# Patient Record
Sex: MALE | Race: WHITE | NOT HISPANIC OR LATINO | Employment: FULL TIME | URBAN - NONMETROPOLITAN AREA
[De-identification: names, ages, dates, MRNs, and addresses within clinical notes are randomized per-mention and may not be internally consistent; named-entity substitution may affect disease eponyms.]

---

## 2017-01-29 ENCOUNTER — HOSPITAL ENCOUNTER (EMERGENCY)
Facility: HOSPITAL | Age: 42
Discharge: LEFT BEFORE DISCHARGE | End: 2017-01-29
Attending: EMERGENCY MEDICINE
Payer: COMMERCIAL

## 2017-01-29 VITALS
TEMPERATURE: 99 F | HEART RATE: 70 BPM | HEIGHT: 78 IN | RESPIRATION RATE: 22 BRPM | SYSTOLIC BLOOD PRESSURE: 111 MMHG | BODY MASS INDEX: 19.67 KG/M2 | WEIGHT: 170 LBS | OXYGEN SATURATION: 96 % | DIASTOLIC BLOOD PRESSURE: 56 MMHG

## 2017-01-29 DIAGNOSIS — T78.40XA ALLERGIC REACTION: Primary | ICD-10-CM

## 2017-01-29 PROCEDURE — 99283 EMERGENCY DEPT VISIT LOW MDM: CPT

## 2017-01-29 PROCEDURE — 96361 HYDRATE IV INFUSION ADD-ON: CPT

## 2017-01-29 PROCEDURE — 96374 THER/PROPH/DIAG INJ IV PUSH: CPT

## 2017-01-29 PROCEDURE — 96375 TX/PRO/DX INJ NEW DRUG ADDON: CPT

## 2017-01-29 RX ORDER — DIPHENHYDRAMINE HYDROCHLORIDE 50 MG/ML
25 INJECTION INTRAMUSCULAR; INTRAVENOUS ONCE
Status: COMPLETED | OUTPATIENT
Start: 2017-01-29 | End: 2017-01-29

## 2017-01-29 RX ORDER — METHYLPREDNISOLONE SODIUM SUCCINATE 125 MG/2ML
125 INJECTION, POWDER, LYOPHILIZED, FOR SOLUTION INTRAMUSCULAR; INTRAVENOUS ONCE
Status: COMPLETED | OUTPATIENT
Start: 2017-01-29 | End: 2017-01-29

## 2017-01-29 RX ORDER — PREDNISONE 20 MG/1
60 TABLET ORAL DAILY
Qty: 15 TABLET | Refills: 0 | Status: SHIPPED | OUTPATIENT
Start: 2017-01-29 | End: 2017-02-03

## 2017-01-29 RX ORDER — TRAZODONE HYDROCHLORIDE 100 MG/1
100 TABLET ORAL
COMMUNITY
End: 2018-04-07

## 2017-01-29 RX ORDER — LITHIUM CARBONATE 600 MG/1
300 CAPSULE ORAL 2 TIMES DAILY WITH MEALS
COMMUNITY
End: 2018-04-07

## 2017-01-29 RX ORDER — RISPERIDONE 0.5 MG/1
0.5 TABLET, FILM COATED ORAL 2 TIMES DAILY
COMMUNITY
End: 2018-04-07

## 2017-01-29 RX ADMIN — DIPHENHYDRAMINE HYDROCHLORIDE 25 MG: 50 INJECTION, SOLUTION INTRAMUSCULAR; INTRAVENOUS at 20:34

## 2017-01-29 RX ADMIN — FAMOTIDINE 20 MG: 10 INJECTION, SOLUTION INTRAVENOUS at 20:34

## 2017-01-29 RX ADMIN — SODIUM CHLORIDE 500 ML: 0.9 INJECTION, SOLUTION INTRAVENOUS at 20:35

## 2017-01-29 RX ADMIN — METHYLPREDNISOLONE SODIUM SUCCINATE 125 MG: 125 INJECTION, POWDER, FOR SOLUTION INTRAMUSCULAR; INTRAVENOUS at 20:35

## 2018-04-07 ENCOUNTER — HOSPITAL ENCOUNTER (EMERGENCY)
Facility: HOSPITAL | Age: 43
Discharge: HOME | End: 2018-04-07
Attending: EMERGENCY MEDICINE | Admitting: EMERGENCY MEDICINE
Payer: COMMERCIAL

## 2018-04-07 ENCOUNTER — APPOINTMENT (EMERGENCY)
Dept: RADIOLOGY | Facility: HOSPITAL | Age: 43
End: 2018-04-07
Payer: COMMERCIAL

## 2018-04-07 VITALS
TEMPERATURE: 98.7 F | HEART RATE: 89 BPM | RESPIRATION RATE: 18 BRPM | SYSTOLIC BLOOD PRESSURE: 124 MMHG | WEIGHT: 200 LBS | OXYGEN SATURATION: 95 % | BODY MASS INDEX: 23.11 KG/M2 | DIASTOLIC BLOOD PRESSURE: 78 MMHG

## 2018-04-07 DIAGNOSIS — E87.6 HYPOKALEMIA: ICD-10-CM

## 2018-04-07 DIAGNOSIS — R60.0 BILATERAL LOWER EXTREMITY EDEMA: Primary | ICD-10-CM

## 2018-04-07 LAB
ALBUMIN SERPL BCP-MCNC: 3.1 G/DL (ref 3.5–5)
ALP SERPL-CCNC: 69 U/L (ref 46–116)
ALT SERPL W P-5'-P-CCNC: 16 U/L (ref 12–78)
ANION GAP SERPL CALCULATED.3IONS-SCNC: 8 MMOL/L (ref 4–13)
AST SERPL W P-5'-P-CCNC: 11 U/L (ref 5–45)
BASOPHILS # BLD AUTO: 0.05 THOUSANDS/ΜL (ref 0–0.1)
BASOPHILS NFR BLD AUTO: 0 % (ref 0–1)
BILIRUB SERPL-MCNC: 0.4 MG/DL (ref 0.2–1)
BUN SERPL-MCNC: 24 MG/DL (ref 5–25)
CALCIUM SERPL-MCNC: 8.7 MG/DL (ref 8.3–10.1)
CHLORIDE SERPL-SCNC: 101 MMOL/L (ref 100–108)
CK SERPL-CCNC: 60 U/L (ref 39–308)
CO2 SERPL-SCNC: 30 MMOL/L (ref 21–32)
CREAT SERPL-MCNC: 0.85 MG/DL (ref 0.6–1.3)
EOSINOPHIL # BLD AUTO: 0.23 THOUSAND/ΜL (ref 0–0.61)
EOSINOPHIL NFR BLD AUTO: 2 % (ref 0–6)
ERYTHROCYTE [DISTWIDTH] IN BLOOD BY AUTOMATED COUNT: 13.6 % (ref 11.6–15.1)
GFR SERPL CREATININE-BSD FRML MDRD: 107 ML/MIN/1.73SQ M
GLUCOSE SERPL-MCNC: 117 MG/DL (ref 65–140)
HCT VFR BLD AUTO: 40.4 % (ref 36.5–49.3)
HGB BLD-MCNC: 13.9 G/DL (ref 12–17)
LYMPHOCYTES # BLD AUTO: 1.21 THOUSANDS/ΜL (ref 0.6–4.47)
LYMPHOCYTES NFR BLD AUTO: 9 % (ref 14–44)
MAGNESIUM SERPL-MCNC: 2.1 MG/DL (ref 1.6–2.6)
MCH RBC QN AUTO: 30.4 PG (ref 26.8–34.3)
MCHC RBC AUTO-ENTMCNC: 34.4 G/DL (ref 31.4–37.4)
MCV RBC AUTO: 88 FL (ref 82–98)
MONOCYTES # BLD AUTO: 1.04 THOUSAND/ΜL (ref 0.17–1.22)
MONOCYTES NFR BLD AUTO: 8 % (ref 4–12)
NEUTROPHILS # BLD AUTO: 11.14 THOUSANDS/ΜL (ref 1.85–7.62)
NEUTS SEG NFR BLD AUTO: 81 % (ref 43–75)
PLATELET # BLD AUTO: 334 THOUSANDS/UL (ref 149–390)
PMV BLD AUTO: 9.6 FL (ref 8.9–12.7)
POTASSIUM SERPL-SCNC: 3.2 MMOL/L (ref 3.5–5.3)
PROT SERPL-MCNC: 6.5 G/DL (ref 6.4–8.2)
RBC # BLD AUTO: 4.57 MILLION/UL (ref 3.88–5.62)
SODIUM SERPL-SCNC: 139 MMOL/L (ref 136–145)
TROPONIN I SERPL-MCNC: <0.02 NG/ML
WBC # BLD AUTO: 13.67 THOUSAND/UL (ref 4.31–10.16)

## 2018-04-07 PROCEDURE — 71045 X-RAY EXAM CHEST 1 VIEW: CPT

## 2018-04-07 PROCEDURE — 99284 EMERGENCY DEPT VISIT MOD MDM: CPT

## 2018-04-07 PROCEDURE — 82550 ASSAY OF CK (CPK): CPT | Performed by: EMERGENCY MEDICINE

## 2018-04-07 PROCEDURE — 85025 COMPLETE CBC W/AUTO DIFF WBC: CPT | Performed by: EMERGENCY MEDICINE

## 2018-04-07 PROCEDURE — 36415 COLL VENOUS BLD VENIPUNCTURE: CPT | Performed by: EMERGENCY MEDICINE

## 2018-04-07 PROCEDURE — 96374 THER/PROPH/DIAG INJ IV PUSH: CPT

## 2018-04-07 PROCEDURE — 93005 ELECTROCARDIOGRAM TRACING: CPT | Performed by: EMERGENCY MEDICINE

## 2018-04-07 PROCEDURE — 84484 ASSAY OF TROPONIN QUANT: CPT | Performed by: EMERGENCY MEDICINE

## 2018-04-07 PROCEDURE — 83735 ASSAY OF MAGNESIUM: CPT | Performed by: EMERGENCY MEDICINE

## 2018-04-07 PROCEDURE — 80053 COMPREHEN METABOLIC PANEL: CPT | Performed by: EMERGENCY MEDICINE

## 2018-04-07 PROCEDURE — 96375 TX/PRO/DX INJ NEW DRUG ADDON: CPT

## 2018-04-07 RX ORDER — POTASSIUM CHLORIDE 20 MEQ/1
40 TABLET, EXTENDED RELEASE ORAL ONCE
Status: COMPLETED | OUTPATIENT
Start: 2018-04-07 | End: 2018-04-07

## 2018-04-07 RX ORDER — POTASSIUM CHLORIDE 20 MEQ/1
20 TABLET, EXTENDED RELEASE ORAL 2 TIMES DAILY
Qty: 30 TABLET | Refills: 0 | Status: SHIPPED | OUTPATIENT
Start: 2018-04-07 | End: 2019-01-23 | Stop reason: HOSPADM

## 2018-04-07 RX ORDER — KETOROLAC TROMETHAMINE 30 MG/ML
15 INJECTION, SOLUTION INTRAMUSCULAR; INTRAVENOUS ONCE
Status: COMPLETED | OUTPATIENT
Start: 2018-04-07 | End: 2018-04-07

## 2018-04-07 RX ORDER — FUROSEMIDE 10 MG/ML
20 INJECTION INTRAMUSCULAR; INTRAVENOUS ONCE
Status: COMPLETED | OUTPATIENT
Start: 2018-04-07 | End: 2018-04-07

## 2018-04-07 RX ADMIN — FUROSEMIDE 20 MG: 10 INJECTION, SOLUTION INTRAMUSCULAR; INTRAVENOUS at 07:57

## 2018-04-07 RX ADMIN — KETOROLAC TROMETHAMINE 15 MG: 30 INJECTION, SOLUTION INTRAMUSCULAR at 07:55

## 2018-04-07 RX ADMIN — POTASSIUM CHLORIDE 40 MEQ: 1500 TABLET, EXTENDED RELEASE ORAL at 07:59

## 2018-04-07 NOTE — ED PROVIDER NOTES
History  Chief Complaint   Patient presents with    Leg Swelling     pt stated b/l leg pain after a 10mile hike 4 days ago  78-year-old male presents with leg swelling over the last 3 days  He did walk at a brisk pace for 6 miles up and down a [place] and then an additional 4 miles all in a period of 3 hours  He states he is typically in good shape as he does a lot of walking because he does not own a car  Complains of a sensation of razor blades to the bilateral legs  He denies any numbness or tingling there is no trauma or fall  He had happened 6 months ago to his feet and his toes blister that time he has noted no skin lesions or or blisters  No fever or chills  He denies increasing dyspnea on exertion no pleuritic pain no prior history of DVT or PE  He also relates over the last she has been having intermittent headaches with vision problems  None currently             None       Past Medical History:   Diagnosis Date    COPD (chronic obstructive pulmonary disease) ([facility] [address] )     Hyperlipidemia     Hypertension     Psychiatric disorder     bipolar       Past Surgical History:   Procedure Laterality Date    HERNIA REPAIR      TUMOR REMOVAL      groin and shoulder       History reviewed  No pertinent family history  I have reviewed and agree with the history as documented  Social History   Substance Use Topics    Smoking status: Current Every Day Smoker     Types: Cigarettes    Smokeless tobacco: Not on file    Alcohol use No        Review of Systems   Constitutional: Negative for activity change, appetite change, chills, diaphoresis and fever  HENT: Negative for congestion, ear pain, rhinorrhea, sneezing and sore throat  Eyes: Negative for discharge and visual disturbance  Respiratory: Negative for cough and shortness of breath  Cardiovascular: Positive for chest pain and leg swelling  Gastrointestinal: Negative for abdominal pain, blood in stool, diarrhea, nausea and vomiting  Endocrine: Negative for polyuria  Genitourinary: Negative for dysuria, frequency and urgency  Musculoskeletal: Positive for gait problem  Negative for back pain, joint swelling and myalgias  Skin: Negative for rash and wound  Neurological: Negative for dizziness and numbness  Hematological: Negative for adenopathy  Psychiatric/Behavioral: Negative for confusion  All other systems reviewed and are negative  Physical Exam  ED Triage Vitals [04/07/18 0606]   Temperature Pulse Respirations Blood Pressure SpO2   98 7 °F (37 1 °C) 100 18 124/78 98 %      Temp Source Heart Rate Source Patient Position - Orthostatic VS BP Location FiO2 (%)   Temporal Monitor Sitting Left arm --      Pain Score       Worst Possible Pain           Orthostatic Vital Signs  Vitals:    04/07/18 0606 04/07/18 0630   BP: 124/78    Pulse: 100 89   Patient Position - Orthostatic VS: Sitting        Physical Exam   Constitutional: He is oriented to person, place, and time  He appears well-developed  No distress  HENT:   Head: Normocephalic and atraumatic  Right Ear: External ear normal    Left Ear: External ear normal    Nose: Nose normal    Mouth/Throat: Oropharynx is clear and moist    Eyes: Conjunctivae and EOM are normal  Pupils are equal, round, and reactive to light  Right eye exhibits no discharge  Left eye exhibits no discharge  No scleral icterus  Neck: Normal range of motion  Neck supple  Cardiovascular: Normal rate, regular rhythm, normal heart sounds and intact distal pulses  Pulmonary/Chest: Effort normal and breath sounds normal  No respiratory distress  distant   Abdominal: Soft  Bowel sounds are normal  He exhibits no distension and no mass  There is no tenderness  There is no rebound and no guarding  Back: no mildline or CVA tenderness   Musculoskeletal: Normal range of motion  He exhibits edema (1+ pretibial symmetric)  He exhibits no tenderness or deformity     Lymphadenopathy:     He has no cervical adenopathy  Neurological: He is alert and oriented to person, place, and time  He displays normal reflexes  No cranial nerve deficit  He exhibits normal muscle tone  Gait steady   Skin: Skin is warm and dry  He is not diaphoretic  Psychiatric: He has a normal mood and affect  Nursing note and vitals reviewed  ED Medications  Medications   potassium chloride (K-DUR,KLOR-CON) CR tablet 40 mEq (40 mEq Oral Given 4/7/18 0759)   furosemide (LASIX) injection 20 mg (20 mg Intravenous Given 4/7/18 0437)   ketorolac (TORADOL) injection 15 mg (15 mg Intravenous Given 4/7/18 3115)       Diagnostic Studies  Results Reviewed     Procedure Component Value Units Date/Time    Magnesium [[id number]]  (Normal) Collected:  04/07/18 0733    Lab Status:  Final result Specimen:  Blood Updated:  04/07/18 0741     Magnesium 2 1 mg/dL     Troponin I [[id number]]  (Normal) Collected:  04/07/18 0640    Lab Status:  Final result Specimen:  Blood from Arm, Right Updated:  04/07/18 0721     Troponin I <0 02 ng/mL     Narrative:         [employer] Chemistry analyzer 99% cutoff is > 0 04 ng/mL in network labs    o cTnI 99% cutoff is useful only when applied to patients in the clinical setting of myocardial ischemia  o cTnI 99% cutoff should be interpreted in the context of clinical history, ECG findings and possibly cardiac imaging to establish correct diagnosis  o cTnI 99% cutoff may be suggestive but clearly not indicative of a coronary event without the clinical setting of myocardial ischemia      Comprehensive metabolic panel [[id number]]  (Abnormal) Collected:  04/07/18 0640    Lab Status:  Final result Specimen:  Blood from Arm, Right Updated:  04/07/18 0716     Sodium 139 mmol/L      Potassium 3 2 (L) mmol/L      Chloride 101 mmol/L      CO2 30 mmol/L      Anion Gap 8 mmol/L      BUN 24 mg/dL      Creatinine 0 85 mg/dL      Glucose 117 mg/dL      Calcium 8 7 mg/dL      AST 11 U/L      ALT 16 U/L      Alkaline Phosphatase 69 U/L Total Protein 6 5 g/dL      Albumin 3 1 (L) g/dL      Total Bilirubin 0 40 mg/dL      eGFR 107 ml/min/1 73sq m     Narrative:         National Kidney Disease Education Program recommendations are as follows:  GFR calculation is accurate only with a steady state creatinine  Chronic Kidney disease less than 60 ml/min/1 73 sq  meters  Kidney failure less than 15 ml/min/1 73 sq  meters  CK Total with Reflex CKMB [[id number]]  (Normal) Collected:  04/07/18 0640    Lab Status:  Final result Specimen:  Blood from Arm, Right Updated:  04/07/18 0716     Total CK 60 U/L     CBC and differential [[id number]]  (Abnormal) Collected:  04/07/18 0640    Lab Status:  Final result Specimen:  Blood from Arm, Right Updated:  04/07/18 0703     WBC 13 67 (H) Thousand/uL      RBC 4 57 Million/uL      Hemoglobin 13 9 g/dL      Hematocrit 40 4 %      MCV 88 fL      MCH 30 4 pg      MCHC 34 4 g/dL      RDW 13 6 %      MPV 9 6 fL      Platelets 816 Thousands/uL      Neutrophils Relative 81 (H) %      Lymphocytes Relative 9 (L) %      Monocytes Relative 8 %      Eosinophils Relative 2 %      Basophils Relative 0 %      Neutrophils Absolute 11 14 (H) Thousands/µL      Lymphocytes Absolute 1 21 Thousands/µL      Monocytes Absolute 1 04 Thousand/µL      Eosinophils Absolute 0 23 Thousand/µL      Basophils Absolute 0 05 Thousands/µL     UA w Reflex to Microscopic w Reflex to Culture [[id number]]     Lab Status:  No result Specimen:  Urine                  XR chest 1 view portable   Final Result by Lavern Zayas MD (04/07 0815)      No acute cardiopulmonary disease              Workstation performed: [id number]                    Procedures  ECG 12 Lead Documentation  Date/Time: 4/7/2018 7:00 AM  Performed by: Mendy Trejo  Authorized by: Mendy Trejo     Indications / Diagnosis:  Leg swelling  ECG reviewed by me, the ED Provider: yes    Patient location:  ED  Previous ECG:     Previous ECG:  Unavailable  Rate:     ECG rate:  96 ECG rate assessment: normal    Rhythm:     Rhythm: sinus rhythm    QRS:     QRS axis:  Normal  Comments:      No acute ischemic changes           Phone Contacts  ED Phone Contact    ED Course  ED Course as of Apr 07 0918   Sat Apr 07, 2018   2639 Patient states he is no longer on Lithium    0854 Resting comfortably  Reviewed results will supplement postasssium    0859 Urinated large amount legs feel better    0911 PIV left a/c discontinued tip intact                                MDM  Number of Diagnoses or Management Options  Bilateral lower extremity edema:   Hypokalemia:   Diagnosis management comments: Mdm: chf, acs, overuse, rhabdo, no clinical evidence of compartment syndrome    Recommended establ of PMD provided with referral numbers for futher eval of headaches/visual changes  No current symptoms    CritCare Time    Disposition  Final diagnoses:   Bilateral lower extremity edema   Hypokalemia     Time reflects when diagnosis was documented in both MDM as applicable and the Disposition within this note     Time User Action Codes Description Comment    4/7/2018  9:01 AM Reji Allen Add [R60 0] Bilateral lower extremity edema     4/7/2018  9:02 AM Reji Allen Add [E87 6] Hypokalemia       ED Disposition     ED Disposition Condition Comment    Discharge  [address] discharge to home/self care  Condition at discharge: Good        Follow-up Information     Follow up With Specialties Details Why [address]  Call in 1 day establish family docotor [phone]          Patient's Medications   Discharge Prescriptions    POTASSIUM CHLORIDE (K-DUR,KLOR-CON) 20 MEQ TABLET    Take 1 tablet (20 mEq total) by mouth 2 (two) times a day       Start Date: 4/7/2018  End Date: --       Order Dose: 20 mEq       Quantity: 30 tablet    Refills: 0     No discharge procedures on file      ED Provider  Electronically Signed by           Comfort Aldrich MD  04/07/18 0321

## 2018-04-07 NOTE — DISCHARGE INSTRUCTIONS
Leg Edema   WHAT YOU NEED TO KNOW:   Leg edema is swelling caused by fluid buildup  Your legs may swell if you sit or stand for long periods of time, are pregnant, or are injured  Swelling may also occur if you have heart failure or circulation problems  This means that your heart does not pump blood through your body as it should  DISCHARGE INSTRUCTIONS:   Self-care:   · Elevate your legs:  Raise your legs above the level of your heart as often as you can  This will help decrease swelling and pain  Prop your legs on pillows or blankets to keep them elevated comfortably  · Wear pressure stockings: These tight stockings put pressure on your legs to promote blood flow and prevent blood clots  Wear the stockings during the day  Do not wear them while you sleep  · Apply heat:  Heat helps decrease pain and swelling  Apply heat on the area for 20 to 30 minutes every 2 hours for as many days as directed  · Stay active:  Do not stand or sit for long periods of time  Ask your healthcare provider about the best exercise plan for you  · Eat healthy foods:  Healthy foods include fruits, vegetables, whole-grain breads, low-fat dairy products, beans, lean meats, and fish  Ask if you need to be on a special diet  Limit salt  Salt will make your body hold even more fluid  Follow up with your healthcare provider as directed:  Write down your questions so you remember to ask them during your visits  Contact your healthcare provider if:   · You have a fever or feel more tired than usual     · The veins in your legs look larger than usual  They may look full or bulging  · Your legs itch or feel heavy  · You have red or white areas or sores on your legs  The skin may also appear dimpled or have indentations  · You are gaining weight  · You have trouble moving your ankles  · The swelling does not go away, or other parts of your body swell      · You have questions or concerns about your condition or care   Return to the emergency department if:   · You cannot walk  · You feel faint or confused  · Your skin turns blue or gray  · Your leg feels warm, tender, and painful  It may be swollen and red  · You have chest pain or trouble breathing that is worse when you lie down  · You suddenly feel lightheaded and have trouble breathing  · You have new and sudden chest pain  You may have more pain when you take deep breaths or cough  You may also cough up blood  © 2017 [address] Information is for End User's use only and may not be sold, redistributed or otherwise used for commercial purposes  All illustrations and images included in CareNotes® are the copyrighted property of A D A M , Inc  or Damian Arnold  The above information is an  only  It is not intended as medical advice for individual conditions or treatments  Talk to your doctor, nurse or pharmacist before following any medical regimen to see if it is safe and effective for you  Hypokalemia   WHAT YOU NEED TO KNOW:   Hypokalemia is a low level of potassium in your blood  Potassium helps control how your muscles, heart, and digestive system work  Hypokalemia occurs when your body loses too much potassium or does not absorb enough from food  DISCHARGE INSTRUCTIONS:   Return to the emergency department if:   · You cannot move your arm or leg  · You have a fast or irregular heartbeat  · You are too tired or weak to stand up  Contact your healthcare provider if:   · You are vomiting, or you have diarrhea  · You have numbness or tingling in your arms or legs  · Your symptoms do not go away or they get worse  · You have questions or concerns about your condition or care  Medicines:   · Potassium  will be given to bring your potassium levels back to normal     · Take your medicine as directed    Contact your healthcare provider if you think your medicine is not helping or if you have side effects  Tell him of her if you are allergic to any medicine  Keep a list of the medicines, vitamins, and herbs you take  Include the amounts, and when and why you take them  Bring the list or the pill bottles to follow-up visits  Carry your medicine list with you in case of an emergency  Eat foods that are high in potassium:  Foods that are high in potassium include bananas, oranges, tomatoes, potatoes, and avocado  Miller beans, turkey, salmon, lean beef, yogurt, and milk are also high in potassium  Ask your healthcare provider or dietitian for more information about foods that are high in potassium  Follow up with your healthcare provider as directed:  Write down your questions so you remember to ask them during your visits  © 2017 [address] Information is for End User's use only and may not be sold, redistributed or otherwise used for commercial purposes  All illustrations and images included in CareNotes® are the copyrighted property of A D A M , Inc  or Damian Arnold  The above information is an  only  It is not intended as medical advice for individual conditions or treatments  Talk to your doctor, nurse or pharmacist before following any medical regimen to see if it is safe and effective for you  Potassium Content of Foods List   WHAT YOU NEED TO KNOW:   Potassium is a mineral that is found in most foods  Potassium helps to balance fluids and minerals in your body  It also helps your body maintain a normal blood pressure  Potassium helps your muscles contract and your nerves function normally  You may need to increase or decrease potassium if you have certain health conditions  DISCHARGE INSTRUCTIONS:   Why you may need to change the amount of potassium you eat:   · You may need more potassium  if you have hypokalemia (low potassium levels) or high blood pressure  You may also need more potassium if you are taking diuretics   Diuretics and certain medicines cause your body to lose potassium  · You may need less potassium  in your diet if you have hyperkalemia (high potassium levels) or kidney disease  Potassium content of fruit:  The amount of potassium in milligrams (mg) contained in each fruit or serving of fruit is listed beside the item    · High-potassium foods (more than 200 mg per serving):      ¨ 1 medium banana (425)    ¨ ½ of a papaya (390)    ¨ ½ cup of prune juice (370)    ¨ ¼ cup of raisins (270)    ¨ 1 medium zane (325) or kiwi (240)    ¨ 1 small orange (240) or ½ cup of orange juice (235)    ¨ ½ cup of cubed cantaloupe (215) or diced honeydew melon (200)    ¨ 1 medium pear (200)    · Medium-potassium foods (50 to 200 mg per serving):      ¨ 1 medium peach (185)    ¨ 1 small apple or ½ cup of apple juice (150)    ¨ ½ cup of peaches canned in juice (120)    ¨ ½ cup of canned pineapple (100)    ¨ ½ cup of fresh, sliced strawberries (738)    ¨ ½ cup of watermelon (85)    · Low-potassium foods (less than 50 mg per serving):      ¨ ½ cup of cranberries (45) or cranberry juice cocktail (20)    ¨ ½ cup of nectar of papaya, zane, or pear (35)  Potassium content of vegetables:   · High-potassium foods (more than 200 mg per serving):      ¨ 1 medium baked potato, with skin (925)    ¨ 1 baked medium sweet potato, with skin (450)    ¨ ½ cup of tomato or vegetable juice (275), or 1 medium raw tomato (290)    ¨ ½ cup of mushrooms (280)    ¨ ½ cup of fresh brussels sprouts (250)    ¨ ½ cup of cooked zucchini (220) or winter squash (250)    ¨ ¼ of a medium avocado (245)    ¨ ½ cup of broccoli (230)    · Medium-potassium foods (50 to 200 mg per serving):      ¨ ½ cup of corn (195)    ¨ ½ cup of fresh or cooked carrots (180)    ¨ ½ cup of fresh cauliflower (150)    ¨ ½ cup of asparagus (155)    ¨ ½ cup of canned peas (90)     ¨ 1 cup of lettuce, all types (100)    ¨ ½ cup of fresh green beans (90)    ¨ ½ cup of frozen green beans (85)    ¨ ½ cup of cucumber (80)  Potassium content of protein foods:   · High-potassium foods (more than 200 mg per serving):      ¨ ½ cup of cooked booth beans (400) or lentils (365)    ¨ 1 cup of soy milk (300)    ¨ 3 ounces of baked or broiled salmon (319)    ¨ 3 ounces of roasted turkey, dark meat (250)    ¨ ¼ cup of sunflower seeds (241)    ¨ 3 ounces of cooked lean beef (224)    ¨ 2 tablespoons of smooth peanut butter (210)    · Medium-potassium foods (50 to 200 mg per serving):      ¨ 1 ounce of salted peanuts, almonds, or cashews (200)    ¨ 1 large egg (60 mg)  Potassium content of dairy foods:   · High-potassium foods (more than 200 mg per serving):      ¨ 6 ounces of yogurt (260 to 435)    ¨ 1 cup of nonfat, low-fat, or whole milk (350 to 380)    · Medium-potassium foods (50 to 200 mg per serving):      ¨ ½ cup of ricotta cheese (154)    ¨ ½ cup of vanilla ice cream (131)    ¨ ½ cup of low-fat (2%) cottage cheese (110)    · Low-potassium foods (less than 50 mg per serving):      ¨ 1 ounce of cheese (20 to 30)    Potassium content of grains:   · 1 slice of white bread (30)    · ½ cup of white or brown rice (50)    · ½ cup of spaghetti or macaroni (30)    · 1 flour or corn tortilla (50)    · 1 four-inch waffle (50)  Potassium content of other foods:   · 1 tablespoon of molasses (295)    · 1½ ounces of chocolate (165)    · Some salt substitutes may contain a high amount of potassium  Check the food label to find the amount of potassium it contains  © 2017 [facility] Information is for End User's use only and may not be sold, redistributed or otherwise used for commercial purposes  All illustrations and images included in CareNotes® are the copyrighted property of [employer]  or Damian Arnold  The above information is an  only  It is not intended as medical advice for individual conditions or treatments   Talk to your doctor, nurse or pharmacist before following any medical regimen to see if it is safe and effective for you

## 2018-04-08 LAB
ATRIAL RATE: 96 BPM
P AXIS: -1 DEGREES
PR INTERVAL: 134 MS
QRS AXIS: 25 DEGREES
QRSD INTERVAL: 82 MS
QT INTERVAL: 348 MS
QTC INTERVAL: 439 MS
T WAVE AXIS: 14 DEGREES
VENTRICULAR RATE: 96 BPM

## 2018-04-08 PROCEDURE — 93010 ELECTROCARDIOGRAM REPORT: CPT | Performed by: INTERNAL MEDICINE

## 2019-01-18 ENCOUNTER — HOSPITAL ENCOUNTER (EMERGENCY)
Facility: HOSPITAL | Age: 44
End: 2019-01-18
Attending: EMERGENCY MEDICINE | Admitting: EMERGENCY MEDICINE
Payer: COMMERCIAL

## 2019-01-18 ENCOUNTER — ANESTHESIA (INPATIENT)
Dept: PERIOP | Facility: HOSPITAL | Age: 44
DRG: 316 | End: 2019-01-18
Payer: COMMERCIAL

## 2019-01-18 ENCOUNTER — APPOINTMENT (EMERGENCY)
Dept: RADIOLOGY | Facility: HOSPITAL | Age: 44
End: 2019-01-18
Payer: COMMERCIAL

## 2019-01-18 ENCOUNTER — HOSPITAL ENCOUNTER (INPATIENT)
Facility: HOSPITAL | Age: 44
LOS: 5 days | Discharge: HOME | DRG: 316 | End: 2019-01-23
Attending: INTERNAL MEDICINE | Admitting: INTERNAL MEDICINE
Payer: COMMERCIAL

## 2019-01-18 ENCOUNTER — ANESTHESIA EVENT (INPATIENT)
Dept: PERIOP | Facility: HOSPITAL | Age: 44
DRG: 316 | End: 2019-01-18
Payer: COMMERCIAL

## 2019-01-18 VITALS
BODY MASS INDEX: 21.98 KG/M2 | RESPIRATION RATE: 18 BRPM | WEIGHT: 190 LBS | SYSTOLIC BLOOD PRESSURE: 114 MMHG | HEART RATE: 59 BPM | OXYGEN SATURATION: 96 % | TEMPERATURE: 97.3 F | HEIGHT: 78 IN | DIASTOLIC BLOOD PRESSURE: 64 MMHG

## 2019-01-18 DIAGNOSIS — M65.9 FLEXOR TENOSYNOVITIS OF FINGER: Primary | ICD-10-CM

## 2019-01-18 DIAGNOSIS — M65.9 FLEXOR TENOSYNOVITIS OF FINGER: ICD-10-CM

## 2019-01-18 DIAGNOSIS — L02.512 ABSCESS OF FINGER OF LEFT HAND: ICD-10-CM

## 2019-01-18 PROBLEM — R03.0 ELEVATED BLOOD PRESSURE READING: Status: ACTIVE | Noted: 2019-01-18

## 2019-01-18 LAB
ANION GAP SERPL CALCULATED.3IONS-SCNC: 7 MMOL/L (ref 4–13)
BASOPHILS # BLD AUTO: 0.07 THOUSANDS/ΜL (ref 0–0.1)
BASOPHILS NFR BLD AUTO: 1 % (ref 0–1)
BUN SERPL-MCNC: 21 MG/DL (ref 5–25)
CALCIUM SERPL-MCNC: 9.1 MG/DL (ref 8.3–10.1)
CHLORIDE SERPL-SCNC: 105 MMOL/L (ref 100–108)
CO2 SERPL-SCNC: 30 MMOL/L (ref 21–32)
CREAT SERPL-MCNC: 0.81 MG/DL (ref 0.6–1.3)
EOSINOPHIL # BLD AUTO: 0.35 THOUSAND/ΜL (ref 0–0.61)
EOSINOPHIL NFR BLD AUTO: 3 % (ref 0–6)
ERYTHROCYTE [DISTWIDTH] IN BLOOD BY AUTOMATED COUNT: 13.2 % (ref 11.6–15.1)
GFR SERPL CREATININE-BSD FRML MDRD: 108 ML/MIN/1.73SQ M
GLUCOSE SERPL-MCNC: 98 MG/DL (ref 65–140)
HCT VFR BLD AUTO: 45 % (ref 36.5–49.3)
HGB BLD-MCNC: 14.8 G/DL (ref 12–17)
IMM GRANULOCYTES # BLD AUTO: 0.05 THOUSAND/UL (ref 0–0.2)
IMM GRANULOCYTES NFR BLD AUTO: 0 % (ref 0–2)
INR PPP: 0.94 (ref 0.86–1.17)
LYMPHOCYTES # BLD AUTO: 2.66 THOUSANDS/ΜL (ref 0.6–4.47)
LYMPHOCYTES NFR BLD AUTO: 20 % (ref 14–44)
MAGNESIUM SERPL-MCNC: 1.9 MG/DL (ref 1.6–2.6)
MCH RBC QN AUTO: 30.6 PG (ref 26.8–34.3)
MCHC RBC AUTO-ENTMCNC: 32.9 G/DL (ref 31.4–37.4)
MCV RBC AUTO: 93 FL (ref 82–98)
MONOCYTES # BLD AUTO: 1.14 THOUSAND/ΜL (ref 0.17–1.22)
MONOCYTES NFR BLD AUTO: 9 % (ref 4–12)
NEUTROPHILS # BLD AUTO: 8.88 THOUSANDS/ΜL (ref 1.85–7.62)
NEUTS SEG NFR BLD AUTO: 67 % (ref 43–75)
NRBC BLD AUTO-RTO: 0 /100 WBCS
PLATELET # BLD AUTO: 345 THOUSANDS/UL (ref 149–390)
PMV BLD AUTO: 9.7 FL (ref 8.9–12.7)
POTASSIUM SERPL-SCNC: 4.2 MMOL/L (ref 3.5–5.3)
PROTHROMBIN TIME: 12.1 SECONDS (ref 11.8–14.2)
RBC # BLD AUTO: 4.84 MILLION/UL (ref 3.88–5.62)
SODIUM SERPL-SCNC: 142 MMOL/L (ref 136–145)
WBC # BLD AUTO: 13.15 THOUSAND/UL (ref 4.31–10.16)

## 2019-01-18 PROCEDURE — 73130 X-RAY EXAM OF HAND: CPT

## 2019-01-18 PROCEDURE — 0JBK0ZZ EXCISION OF LEFT HAND SUBCUTANEOUS TISSUE AND FASCIA, OPEN APPROACH: ICD-10-PCS | Performed by: SURGERY

## 2019-01-18 PROCEDURE — 85025 COMPLETE CBC W/AUTO DIFF WBC: CPT | Performed by: EMERGENCY MEDICINE

## 2019-01-18 PROCEDURE — 80048 BASIC METABOLIC PNL TOTAL CA: CPT | Performed by: EMERGENCY MEDICINE

## 2019-01-18 PROCEDURE — 96361 HYDRATE IV INFUSION ADD-ON: CPT

## 2019-01-18 PROCEDURE — 96365 THER/PROPH/DIAG IV INF INIT: CPT

## 2019-01-18 PROCEDURE — 87147 CULTURE TYPE IMMUNOLOGIC: CPT | Performed by: SURGERY

## 2019-01-18 PROCEDURE — 85610 PROTHROMBIN TIME: CPT | Performed by: EMERGENCY MEDICINE

## 2019-01-18 PROCEDURE — 99285 EMERGENCY DEPT VISIT HI MDM: CPT

## 2019-01-18 PROCEDURE — 96366 THER/PROPH/DIAG IV INF ADDON: CPT

## 2019-01-18 PROCEDURE — 87075 CULTR BACTERIA EXCEPT BLOOD: CPT | Performed by: SURGERY

## 2019-01-18 PROCEDURE — 87205 SMEAR GRAM STAIN: CPT | Performed by: SURGERY

## 2019-01-18 PROCEDURE — 87070 CULTURE OTHR SPECIMN AEROBIC: CPT | Performed by: SURGERY

## 2019-01-18 PROCEDURE — 87186 SC STD MICRODIL/AGAR DIL: CPT | Performed by: SURGERY

## 2019-01-18 PROCEDURE — 99223 1ST HOSP IP/OBS HIGH 75: CPT | Performed by: INTERNAL MEDICINE

## 2019-01-18 PROCEDURE — 26010 DRAINAGE OF FINGER ABSCESS: CPT | Performed by: SURGERY

## 2019-01-18 PROCEDURE — 83735 ASSAY OF MAGNESIUM: CPT | Performed by: EMERGENCY MEDICINE

## 2019-01-18 PROCEDURE — 96375 TX/PRO/DX INJ NEW DRUG ADDON: CPT

## 2019-01-18 PROCEDURE — 99254 IP/OBS CNSLTJ NEW/EST MOD 60: CPT | Performed by: SURGERY

## 2019-01-18 PROCEDURE — 36415 COLL VENOUS BLD VENIPUNCTURE: CPT | Performed by: EMERGENCY MEDICINE

## 2019-01-18 PROCEDURE — 26020 DRAIN HAND TENDON SHEATH: CPT | Performed by: SURGERY

## 2019-01-18 PROCEDURE — 11042 DBRDMT SUBQ TIS 1ST 20SQCM/<: CPT | Performed by: SURGERY

## 2019-01-18 RX ORDER — ONDANSETRON 2 MG/ML
4 INJECTION INTRAMUSCULAR; INTRAVENOUS ONCE AS NEEDED
Status: CANCELLED | OUTPATIENT
Start: 2019-01-18

## 2019-01-18 RX ORDER — NICOTINE 21 MG/24HR
21 PATCH, TRANSDERMAL 24 HOURS TRANSDERMAL ONCE
Status: DISCONTINUED | OUTPATIENT
Start: 2019-01-18 | End: 2019-01-18 | Stop reason: HOSPADM

## 2019-01-18 RX ORDER — CEFAZOLIN SODIUM 2 G/50ML
2000 SOLUTION INTRAVENOUS ONCE
Status: COMPLETED | OUTPATIENT
Start: 2019-01-18 | End: 2019-01-18

## 2019-01-18 RX ORDER — CEFAZOLIN SODIUM 2 G/50ML
SOLUTION INTRAVENOUS AS NEEDED
Status: DISCONTINUED | OUTPATIENT
Start: 2019-01-18 | End: 2019-01-18 | Stop reason: SURG

## 2019-01-18 RX ORDER — ACETAMINOPHEN 325 MG/1
650 TABLET ORAL EVERY 6 HOURS PRN
Status: DISCONTINUED | OUTPATIENT
Start: 2019-01-18 | End: 2019-01-23 | Stop reason: HOSPADM

## 2019-01-18 RX ORDER — FENTANYL CITRATE 50 UG/ML
INJECTION, SOLUTION INTRAMUSCULAR; INTRAVENOUS AS NEEDED
Status: DISCONTINUED | OUTPATIENT
Start: 2019-01-18 | End: 2019-01-18 | Stop reason: SURG

## 2019-01-18 RX ORDER — MIDAZOLAM HYDROCHLORIDE 1 MG/ML
INJECTION INTRAMUSCULAR; INTRAVENOUS AS NEEDED
Status: DISCONTINUED | OUTPATIENT
Start: 2019-01-18 | End: 2019-01-18 | Stop reason: SURG

## 2019-01-18 RX ORDER — SODIUM CHLORIDE, SODIUM LACTATE, POTASSIUM CHLORIDE, CALCIUM CHLORIDE 600; 310; 30; 20 MG/100ML; MG/100ML; MG/100ML; MG/100ML
125 INJECTION, SOLUTION INTRAVENOUS CONTINUOUS
Status: DISCONTINUED | OUTPATIENT
Start: 2019-01-18 | End: 2019-01-18 | Stop reason: HOSPADM

## 2019-01-18 RX ORDER — LABETALOL HYDROCHLORIDE 5 MG/ML
INJECTION, SOLUTION INTRAVENOUS AS NEEDED
Status: DISCONTINUED | OUTPATIENT
Start: 2019-01-18 | End: 2019-01-18 | Stop reason: SURG

## 2019-01-18 RX ORDER — FENTANYL CITRATE 50 UG/ML
100 INJECTION, SOLUTION INTRAMUSCULAR; INTRAVENOUS
Status: DISCONTINUED | OUTPATIENT
Start: 2019-01-18 | End: 2019-01-18 | Stop reason: HOSPADM

## 2019-01-18 RX ORDER — AMOXICILLIN 250 MG
2 CAPSULE ORAL DAILY
Status: DISCONTINUED | OUTPATIENT
Start: 2019-01-18 | End: 2019-01-23 | Stop reason: HOSPADM

## 2019-01-18 RX ORDER — OXYCODONE HYDROCHLORIDE 10 MG/1
10 TABLET ORAL EVERY 4 HOURS PRN
Status: DISCONTINUED | OUTPATIENT
Start: 2019-01-18 | End: 2019-01-19

## 2019-01-18 RX ORDER — LIDOCAINE HYDROCHLORIDE 10 MG/ML
INJECTION, SOLUTION INFILTRATION; PERINEURAL AS NEEDED
Status: DISCONTINUED | OUTPATIENT
Start: 2019-01-18 | End: 2019-01-18 | Stop reason: SURG

## 2019-01-18 RX ORDER — SODIUM CHLORIDE 9 MG/ML
INJECTION, SOLUTION INTRAVENOUS CONTINUOUS PRN
Status: DISCONTINUED | OUTPATIENT
Start: 2019-01-18 | End: 2019-01-18 | Stop reason: SURG

## 2019-01-18 RX ORDER — OXYCODONE HYDROCHLORIDE 5 MG/1
5 TABLET ORAL EVERY 4 HOURS PRN
Status: DISCONTINUED | OUTPATIENT
Start: 2019-01-18 | End: 2019-01-23 | Stop reason: HOSPADM

## 2019-01-18 RX ORDER — MAGNESIUM HYDROXIDE 1200 MG/15ML
LIQUID ORAL AS NEEDED
Status: DISCONTINUED | OUTPATIENT
Start: 2019-01-18 | End: 2019-01-18 | Stop reason: HOSPADM

## 2019-01-18 RX ORDER — MORPHINE SULFATE 4 MG/ML
4 INJECTION, SOLUTION INTRAMUSCULAR; INTRAVENOUS ONCE
Status: COMPLETED | OUTPATIENT
Start: 2019-01-18 | End: 2019-01-18

## 2019-01-18 RX ORDER — HYDROMORPHONE HCL/PF 1 MG/ML
0.5 SYRINGE (ML) INJECTION
Status: DISCONTINUED | OUTPATIENT
Start: 2019-01-18 | End: 2019-01-23 | Stop reason: HOSPADM

## 2019-01-18 RX ORDER — PROPOFOL 10 MG/ML
INJECTION, EMULSION INTRAVENOUS AS NEEDED
Status: DISCONTINUED | OUTPATIENT
Start: 2019-01-18 | End: 2019-01-18 | Stop reason: SURG

## 2019-01-18 RX ORDER — FENTANYL CITRATE/PF 50 MCG/ML
25 SYRINGE (ML) INJECTION
Status: CANCELLED | OUTPATIENT
Start: 2019-01-18

## 2019-01-18 RX ORDER — ONDANSETRON 2 MG/ML
4 INJECTION INTRAMUSCULAR; INTRAVENOUS EVERY 8 HOURS PRN
Status: DISCONTINUED | OUTPATIENT
Start: 2019-01-18 | End: 2019-01-23 | Stop reason: HOSPADM

## 2019-01-18 RX ORDER — HYDROMORPHONE HCL/PF 1 MG/ML
0.2 SYRINGE (ML) INJECTION
Status: CANCELLED | OUTPATIENT
Start: 2019-01-18

## 2019-01-18 RX ORDER — SODIUM CHLORIDE 9 MG/ML
100 INJECTION, SOLUTION INTRAVENOUS CONTINUOUS
Status: CANCELLED | OUTPATIENT
Start: 2019-01-18

## 2019-01-18 RX ORDER — SODIUM CHLORIDE 9 MG/ML
100 INJECTION, SOLUTION INTRAVENOUS CONTINUOUS
Status: DISCONTINUED | OUTPATIENT
Start: 2019-01-18 | End: 2019-01-23 | Stop reason: HOSPADM

## 2019-01-18 RX ADMIN — FENTANYL CITRATE 50 MCG: 50 INJECTION INTRAMUSCULAR; INTRAVENOUS at 17:35

## 2019-01-18 RX ADMIN — HYDROMORPHONE HYDROCHLORIDE 0.5 MG: 1 INJECTION, SOLUTION INTRAMUSCULAR; INTRAVENOUS; SUBCUTANEOUS at 12:14

## 2019-01-18 RX ADMIN — CEFTRIAXONE 1000 MG: 1 INJECTION, POWDER, FOR SOLUTION INTRAMUSCULAR; INTRAVENOUS at 11:37

## 2019-01-18 RX ADMIN — DEXAMETHASONE SODIUM PHOSPHATE 10 MG: 10 INJECTION INTRAMUSCULAR; INTRAVENOUS at 17:25

## 2019-01-18 RX ADMIN — LIDOCAINE HYDROCHLORIDE ANHYDROUS 50 MG: 10 INJECTION, SOLUTION INFILTRATION at 17:19

## 2019-01-18 RX ADMIN — CEFAZOLIN SODIUM 2000 MG: 2 SOLUTION INTRAVENOUS at 17:00

## 2019-01-18 RX ADMIN — PROPOFOL 200 MG: 10 INJECTION, EMULSION INTRAVENOUS at 17:19

## 2019-01-18 RX ADMIN — SODIUM CHLORIDE: 0.9 INJECTION, SOLUTION INTRAVENOUS at 16:55

## 2019-01-18 RX ADMIN — OXYCODONE HYDROCHLORIDE 10 MG: 10 TABLET ORAL at 11:27

## 2019-01-18 RX ADMIN — VANCOMYCIN HYDROCHLORIDE 1250 MG: 10 INJECTION, POWDER, LYOPHILIZED, FOR SOLUTION INTRAVENOUS at 12:14

## 2019-01-18 RX ADMIN — SODIUM CHLORIDE 100 ML/HR: 0.9 INJECTION, SOLUTION INTRAVENOUS at 23:52

## 2019-01-18 RX ADMIN — MORPHINE SULFATE 4 MG: 4 INJECTION INTRAVENOUS at 05:25

## 2019-01-18 RX ADMIN — VANCOMYCIN HYDROCHLORIDE 1250 MG: 10 INJECTION, POWDER, LYOPHILIZED, FOR SOLUTION INTRAVENOUS at 20:22

## 2019-01-18 RX ADMIN — FENTANYL CITRATE 50 MCG: 50 INJECTION INTRAMUSCULAR; INTRAVENOUS at 17:31

## 2019-01-18 RX ADMIN — FENTANYL CITRATE 100 MCG: 50 INJECTION INTRAMUSCULAR; INTRAVENOUS at 07:46

## 2019-01-18 RX ADMIN — MIDAZOLAM HYDROCHLORIDE 2 MG: 1 INJECTION, SOLUTION INTRAMUSCULAR; INTRAVENOUS at 17:15

## 2019-01-18 RX ADMIN — SODIUM CHLORIDE, SODIUM LACTATE, POTASSIUM CHLORIDE, AND CALCIUM CHLORIDE 125 ML/HR: .6; .31; .03; .02 INJECTION, SOLUTION INTRAVENOUS at 07:15

## 2019-01-18 RX ADMIN — LABETALOL HYDROCHLORIDE 5 MG: 5 INJECTION, SOLUTION INTRAVENOUS at 17:39

## 2019-01-18 RX ADMIN — CEFAZOLIN SODIUM 2000 MG: 2 SOLUTION INTRAVENOUS at 05:15

## 2019-01-18 NOTE — ANESTHESIA PREPROCEDURE EVALUATION
Review of Systems/Medical History          Cardiovascular  EKG reviewed, Hyperlipidemia, Hypertension ,    Pulmonary  Smoker cigarette smoker  , COPD ,   Comment: Marijuana use     GI/Hepatic            Endo/Other     GYN       Hematology   Musculoskeletal       Neurology   Psychology   Depression , bipolar disorder,              Physical Exam    Airway    Mallampati score: I  TM Distance: >3 FB  Neck ROM: full     Dental   upper dentures and lower dentures,     Cardiovascular      Pulmonary      Other Findings        Anesthesia Plan  ASA Score- 2     Anesthesia Type- general with ASA Monitors  Additional Monitors:   Airway Plan: LMA  Plan Factors-    Induction- intravenous  Postoperative Plan-     Informed Consent- Anesthetic plan and risks discussed with patient  I personally reviewed this patient with the CRNA  Discussed and agreed on the Anesthesia Plan with the CRNA  Song Dexter

## 2019-01-18 NOTE — ASSESSMENT & PLAN NOTE
· Systolic BP in the 265C to 160s at the time of presentation  Likely in the setting of acute pain  · He denies any medical history however review of his old records do show history of hypertension  · Patient not taking any medications prior to admission  · Monitor at this time    Expect improvement with adequate pain control  · Encourage outpatient follow-up with a primary care physician for routine care

## 2019-01-18 NOTE — CONSULTS
Orthopedics   Alexi Confer 39 y o  male MRN: [id number]  Unit/Bed#:       Chief Complaint:   Left index finger pain    HPI:   43 y o male complaining of left index finger erythema and pain  Right-hand-dominant male with history of puncture wound to the volar PIP joint of the index finger approximately 5 days ago with a screwdriver, progressive pain swelling and erythema without fevers  Initially presented to outside ER with concern for flexor tenosynovitis transferred for definitive surgical management  Denies a numbness tingling    Review Of Systems:   · Skin: Normal  · Neuro: See HPI  · Musculoskeletal: See HPI  · 14 point review of systems negative except as stated above     Past Medical History:   Past Medical History:   Diagnosis Date    COPD (chronic obstructive pulmonary disease) ([facility] [address] )     Hyperlipidemia     Hypertension     Psychiatric disorder     bipolar       Past Surgical History:   Past Surgical History:   Procedure Laterality Date    HERNIA REPAIR      TUMOR REMOVAL      groin and shoulder       Family History:  Family history reviewed and non-contributory  History reviewed  No pertinent family history      Social History:  Social History     Social History    Marital status: Legally      Spouse name: N/A    Number of children: N/A    Years of education: N/A     Social History Main Topics    Smoking status: Current Every Day Smoker     Packs/day: 1 50     Types: Cigarettes    Smokeless tobacco: Never Used    Alcohol use No    Drug use: No    Sexual activity: Not Asked     Other Topics Concern    None     Social History Narrative    None       Allergies:   No Known Allergies        Labs:    0  Lab Value Date/Time   HCT 45 0 01/18/2019 0509   HCT 40 4 04/07/2018 0640   HCT 48 2 01/31/2016 0822   HCT 50 7 (H) 06/19/2014 0950   HGB 14 8 01/18/2019 0509   HGB 13 9 04/07/2018 0640   HGB 16 4 01/31/2016 0822   HGB 18 3 (H) 06/19/2014 0950   INR 0 94 01/18/2019 0509   INR 1 09 06/19/2014 0950   WBC 13 15 (H) 01/18/2019 0509   WBC 13 67 (H) 04/07/2018 0640   WBC 14 60 (H) 01/31/2016 0822   WBC 14 27 (H) 06/19/2014 0950       Meds:  No current facility-administered medications for this encounter  No current outpatient prescriptions on file  Facility-Administered Medications Ordered in Other Encounters:     acetaminophen (TYLENOL) tablet 650 mg, 650 mg, Oral, Q6H PRN, Billie Vizcaino MD    ceftriaxone (ROCEPHIN) 1 g/50 mL in dextrose IVPB, 1,000 mg, Intravenous, Q24H, Billie Vizcaino MD, Stopped at 01/18/19 1202    HYDROmorphone (DILAUDID) injection 0 5 mg, 0 5 mg, Intravenous, Q3H PRN, Billie Vizcaino MD, 0 5 mg at 01/18/19 1214    naloxone (NARCAN) 0 04 mg/mL syringe 0 04 mg, 0 04 mg, Intravenous, Q1MIN PRN, Billie Vizcaino MD    ondansetron (ZOFRAN) injection 4 mg, 4 mg, Intravenous, Q8H PRN, Billie Vizcaino MD    oxyCODONE (ROXICODONE) immediate release tablet 10 mg, 10 mg, Oral, Q4H PRN, Billie Vizcaino MD, 10 mg at 01/18/19 1127    oxyCODONE (ROXICODONE) IR tablet 5 mg, 5 mg, Oral, Q4H PRN, Billie Vizcaino MD    senna-docusate sodium (SENOKOT S) 8 6-50 mg per tablet 2 tablet, 2 tablet, Oral, Daily, Billie Vizcaino MD    vancomycin (VANCOCIN) 1,250 mg in sodium chloride 0 9 % 250 mL IVPB, 15 mg/kg, Intravenous, Q8H, Billie Vizcaino MD, Last Rate: 166 7 mL/hr at 01/18/19 1214, 1,250 mg at 01/18/19 1214    Blood Culture:   No results found for: BLOODCX    Wound Culture:   No results found for: WOUNDCULT    Ins and Outs:  I/O last 24 hours: In: 250 [I V :200; IV Piggyback:50]  Out: -           Physical Exam:   /92   Pulse 68   Temp 99 °F (37 2 °C)   Resp 18   Ht 6' 6" (1 981 m)   Wt 86 2 kg (190 lb)   SpO2 97%   BMI 21 96 kg/m²   Gen: Alert and oriented to person, place, time  HEENT: EOMI, eyes clear, moist mucus membranes, hearing intact  Respiratory: Bilateral chest rise   No audible wheezing found  Cardiovascular: Regular Rate and Rhythm  Abdomen: soft nontender/nondistended  · Musculoskeletal: leftUpper Extremity  · Skin: Erythema over the dorsum of the left index finger distal to the PIP joint and volarly distal to the PIP joint  · Painful range of motion of of left index finger  · Some pain with passive stretch  · Point of most tenderness is dorsally  · Does have some tenderness palpation along the flexor tendon sheath distal to the PIP joint non at A1 pulley or proximal to PIP joint  · Sensation intact Radial, Ulna and Median  · 5/5 motor to Radial, Ulna and Median    Radiology:   I personally reviewed the films  X-rays of left hand which demonstrates no acute fracture dislocation does have a foreign body in line with the 4th metacarpal, no foreign body in at index finger    [unfilled]    Assessment:  43 y o male with  left index finger cellulitis what appears to be an early flexor tenosynovitis as well as possibly a dorsal abscess    Plan:   · Cont   abx per primary team  · Nonweight bearing to left upper extremity  · Heat to affected area  · Analgesics for pain  · OR today for irrigation debridement  · Consent obtained patient is agreeable  · He understands that he may develop significant stiffness following is infection due to adhesion formation and the flexor tendon sheath      Cade Vilchis MD

## 2019-01-18 NOTE — ASSESSMENT & PLAN NOTE
· Presents 5 days status post puncture wound to left index finger  · X-ray of the left hand showed "There is no acute fracture or dislocation  No significant degenerative changes  No suspicious appearing osseous lesions are seen  Soft tissue swelling of the index digit  There is a 4 mm round metallic foreign body in the volar soft tissues at the level of the distal metacarpal metaphysis  No radiopaque foreign body seen in the index digit"  · Start on IV antibiotics with vancomycin/Rocephin    Planned for OR later today per Orthopedic surgery  · Elevated extremity, pain control
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
## 2019-01-18 NOTE — H&P
H&P- Jo Bledsoe 1975, 39 y o  male MRN: [id number]    Unit/Bed#: ED 11 Encounter: [id number]    Primary Care Provider: No primary care provider on file  Date and time admitted to hospital: 1/18/2019 10:17 AM      Elevated blood pressure reading   Assessment & Plan    · Systolic BP in the 144R to 160s at the time of presentation  Likely in the setting of acute pain  · He denies any medical history however review of his old records do show history of hypertension  · Patient not taking any medications prior to admission  · Monitor at this time  Expect improvement with adequate pain control  · Encourage outpatient follow-up with a primary care physician for routine care     * Flexor tenosynovitis of finger   Assessment & Plan    · Presents 5 days status post puncture wound to left index finger  · X-ray of the left hand showed "There is no acute fracture or dislocation  No significant degenerative changes  No suspicious appearing osseous lesions are seen  Soft tissue swelling of the index digit  There is a 4 mm round metallic foreign body in the volar soft tissues at the level of the distal metacarpal metaphysis  No radiopaque foreign body seen in the index digit"  · Start on IV antibiotics with vancomycin/Rocephin  Planned for OR later today per Orthopedic surgery  · Elevated extremity, pain control         VTE Prophylaxis: Low risk, ambulate  / sequential compression device     Code Status:  DNR/DNI (fully explained to patient and he expressed understanding and confirmed DNR status on multiple occasions  Did state he actually was going to tattoo DNR status on his body/chest wall  He states he has lived a good life, has 5 children and 10 grandchildren and is ready to go whenever God wants to take him )    POLST: POLST form is not discussed and not completed at this time  Anticipated Length of Stay:  Patient will be admitted on an Inpatient basis with an anticipated length of stay of  > 2 midnights  Justification for Hospital Stay:  Left hand flexor tenosynovitis    Chief Complaint:     Left hand pain, swelling, puncture wound    History of Present Illness:  Roxy Yao is a 39 y o  male who presents with left hand swelling, pain and redness  Patient sustained a puncture wound to the right index finger 5 days earlier with a screwdriver  He reports that he does out a shard of metal from his right index finger yesterday but was unable to get the 2nd one out  He noticed swelling of the finger day following the injury  He did not seek medical attention at the time but soaked his hand in salt water and placed antibiotic ointment on the puncture wound  Swelling and pain got worse over the next few days and he noted purulent discharge from the puncture site as well as where he had dug out a metal shard from the finger  He denies any fever or other systemic symptoms  Denies any medical history although review of his old records show a history of COPD, hypertension, hyperlipidemia and bipolar disorder  He works as a  and reports he is up-to-date on his tetanus shot  Review of Systems   Constitutional: Negative  HENT: Negative  Eyes: Negative  Respiratory: Negative  Cardiovascular: Negative  Gastrointestinal: Negative  Genitourinary: Negative  Musculoskeletal:        Left hand pain, swelling, redness   Skin: Positive for wound  Neurological: Negative  Psychiatric/Behavioral: Negative  All other systems reviewed and are negative  Past Medical History:   Diagnosis Date    COPD (chronic obstructive pulmonary disease) ([facility] [address] )     Hyperlipidemia     Hypertension     Psychiatric disorder     bipolar       Past Surgical History:   Procedure Laterality Date    HERNIA REPAIR      TUMOR REMOVAL      groin and shoulder       Family History:  non-contributory    Home Meds:  None    Allergies:    Allergies   Allergen Reactions    Ketorolac Swelling     Eye swelling, throat felt tight         Marital Status: Legally      History   Alcohol Use No     History   Smoking Status    Current Every Day Smoker    Types: Cigarettes   Smokeless Tobacco    Never Used     History   Drug Use    Types: Marijuana           Physical Exam:   Vitals:   Blood Pressure: 141/85 (01/18/19 1131)  Pulse: 78 (01/18/19 1131)  Temperature: 97 9 °F (36 6 °C) (01/18/19 1031)  Temp Source: Oral (01/18/19 1031)  Respirations: 18 (01/18/19 1131)  Weight - Scale: 86 6 kg (190 lb 14 7 oz) (01/18/19 1137)  SpO2: 99 % (01/18/19 1131)    Physical Exam   Constitutional: He is oriented to person, place, and time  He appears well-developed and well-nourished  No distress  HENT:   Head: Normocephalic and atraumatic  Eyes: Conjunctivae and EOM are normal  Right eye exhibits no discharge  Left eye exhibits no discharge  No scleral icterus  Neck: Normal range of motion  Neck supple  Cardiovascular: Normal rate, regular rhythm and normal heart sounds  No murmur heard  Pulmonary/Chest: Effort normal and breath sounds normal  No respiratory distress  He has no wheezes  He has no rales  Abdominal: Soft  Bowel sounds are normal  He exhibits no distension and no mass  There is no tenderness  Musculoskeletal:        Left hand: He exhibits tenderness  Left hand splint/dressing in place, significant edema noted to left index finger with erythema   Neurological: He is alert and oriented to person, place, and time  No cranial nerve deficit  Skin: He is not diaphoretic  There is erythema  Psychiatric: He has a normal mood and affect  His behavior is normal    Vitals reviewed  Lab Results: I have personally reviewed pertinent reports          Results from last 7 days  Lab Units 01/18/19  0509   WBC Thousand/uL 13 15*   HEMOGLOBIN g/dL 14 8   HEMATOCRIT % 45 0   PLATELETS Thousands/uL 345   NEUTROS PCT % 67   LYMPHS PCT % 20   MONOS PCT % 9   EOS PCT % 3       Results from last 7 days  Lab Units 01/18/19  0509   POTASSIUM mmol/L 4 2   CHLORIDE mmol/L 105   CO2 mmol/L 30   BUN mg/dL 21   CREATININE mg/dL 0 81   CALCIUM mg/dL 9 1       Results from last 7 days  Lab Units 01/18/19  0509   INR  0 94       Imaging: I have personally reviewed pertinent reports  Xr Hand 3+ Views Left    Result Date: 1/18/2019  Narrative: LEFT HAND INDICATION:   2nd digit sts x5d after puncture injury to 2nd digit PIP joint  COMPARISON:  None VIEWS:  XR HAND 3+ VW LEFT For the purposes of institution wide universal language the following terms will apply: (thumb=1st digit/finger, index finger=2nd digit/finger, long finger=3rd digit/finger, ring=4th digit/finger and small finger=5th digit/finger)     Impression: FINDINGS/IMPRESSION: There is no acute fracture or dislocation  No significant degenerative changes  No suspicious appearing osseous lesions are seen  Soft tissue swelling of the index digit  There is a 4 mm round metallic foreign body in the volar soft tissues at the level of the distal metacarpal metaphysis  No radiopaque foreign body seen in the index digit  Workstation performed: [id number]       ** Please Note: Dragon 360 Dictation voice to text software may have been used in the creation of this document   **

## 2019-01-18 NOTE — EMTALA/ACUTE CARE TRANSFER
[facility] [facility]  [address] [facility] [address]  Dept: [phone]      EMTALA TRANSFER CONSENT    NAME Govind Flores                                         1975                              MRN [id number]    I have been informed of my rights regarding examination, treatment, and transfer   by Dr Ginger Franks DO    Benefits: Specialized equipment and/or services available at the receiving facility (Include comment)________________________ (Hand surgery)    Risks: Potential for delay in receiving treatment, Other: (Include comment)__________________________, Potential deterioration of medical condition, Loss of IV, Increased discomfort during transfer, Possible worsening of condition or death during transfer (motor vehicle collision)    Consent for Transfer:  I acknowledge that my medical condition has been evaluated and explained to me by the emergency department physician or other qualified medical person and/or my attending physician, who has recommended that I be transferred to the service of  Accepting Physician: Dr Gabriela Longo at [address] [place] [address] Name, [address] : [address] [facility]  The above potential benefits of such transfer, the potential risks associated with such transfer, and the probable risks of not being transferred have been explained to me, and I fully understand them  The doctor has explained that, in my case, the benefits of transfer outweigh the risks  I agree to be transferred  I authorize the performance of emergency medical procedures and treatments upon me in both transit and upon arrival at the receiving facility  Additionally, I authorize the release of any and all medical records to the receiving facility and request they be transported with me, if possible  I understand that the safest mode of transportation during a medical emergency is an ambulance and that the Hospital advocates the use of this mode of transport  Risks of traveling to the receiving facility by car, including absence of medical control, life sustaining equipment, such as oxygen, and medical personnel has been explained to me and I fully understand them  (JEROME CORRECT BOX BELOW)  [  ]  I consent to the stated transfer and to be transported by ambulance/helicopter  [  ]  I consent to the stated transfer, but refuse transportation by ambulance and accept full responsibility for my transportation by car  I understand the risks of non-ambulance transfers and I exonerate the Hospital and its staff from any deterioration in my condition that results from this refusal     X___________________________________________    DATE  19  TIME________  Signature of patient or legally responsible individual signing on patient behalf           RELATIONSHIP TO PATIENT_________________________          Provider Certification    NAME Jaime James                                        [facility] 1975                              MRN [id number]    A medical screening exam was performed on the above named patient  Based on the examination:    Condition Necessitating Transfer The encounter diagnosis was Flexor tenosynovitis of left second finger      Patient Condition: The patient has been stabilized such that within reasonable medical probability, no material deterioration of the patient condition or the condition of the unborn child(thuy) is likely to result from the transfer    Reason for Transfer: Level of Care needed not available at this facility (Hand surgery)    Transfer Requirements: [address]; [place]   · Space available and qualified personnel available for treatment as acknowledged by    · Agreed to accept transfer and to provide appropriate medical treatment as acknowledged by       Dr Zofia Martinez  · Appropriate medical records of the examination and treatment of the patient are provided at the time of transfer   [address] _______  · Transfer will be performed by qualified personnel from    and appropriate transfer equipment as required, including the use of necessary and appropriate life support measures  Provider Certification: I have examined the patient and explained the following risks and benefits of being transferred/refusing transfer to the patient/family:  General risk, such as traffic hazards, adverse weather conditions, rough terrain or turbulence, possible failure of equipment (including vehicle or aircraft), or consequences of actions of persons outside the control of the transport personnel      Based on these reasonable risks and benefits to the patient and/or the unborn child(thuy), and based upon the information available at the time of the patients examination, I certify that the medical benefits reasonably to be expected from the provision of appropriate medical treatments at another medical facility outweigh the increasing risks, if any, to the individuals medical condition, and in the case of labor to the unborn child, from effecting the transfer      X____________________________________________ DATE 01/18/19        TIME_______      ORIGINAL - SEND TO MEDICAL RECORDS   COPY - SEND WITH PATIENT DURING TRANSFER

## 2019-01-18 NOTE — PLAN OF CARE
INFECTION - ADULT     Absence or prevention of progression during hospitalization Progressing        MUSCULOSKELETAL - ADULT     Maintain or return mobility to safest level of function Progressing        PAIN - ADULT     Verbalizes/displays adequate comfort level or baseline comfort level Progressing

## 2019-01-18 NOTE — OP NOTE
OPERATIVE REPORT  PATIENT NAME: Loni Dye  :  1975  MRN: [id number]  Pt Location: [facility] Main OR    SURGERY DATE: 19    Surgeon(s) and Role:     * Layla Marshall MD - Primary    Pre-Op Diagnosis:  Flexor tenosynovitis of finger [M65 9]    Post-Op Diagnosis Codes:     * Flexor tenosynovitis of finger [M65 9]    Procedure(s):  Left Index Finger I&D/Washout (Left)    Specimen(s):    Order Name Source Comment Collection Info Order Time   ANAEROBIC CULTURE  [facility] By: Layla Marshall MD 2019  5:38 PM   WOUND CULTURE Wound  Collected By: Layla Marshall MD 2019  5:38 PM       Estimated Blood Loss:   Minimal    Anesthesia Type:   Choice    IMPLANTS:  * No implants in log *    Tourniquet Time: 33 at 250 mmHg      Antibiotics: 2 grams ancef     Operative Indications: The patient has a history of left index finger puncture wound which resulted in flexor tenosynovitis as well as a dorsal abscess that was recalcitrant to conservative management  The decision was made to bring the patient to the operating room for left index finger irrigation debridement     Risks of the procedure were explained which include, but are not limited to bleeding; infection; damage to nerves, arteries,veins, tendons; scar; pain; need for reoperation; failure to give desired result; and risks of anaesthesia  All questions were answered to satisfaction and they were willing to proceed  Operative Findings:  No significant purulence within the flexor tendon sheath  Purulent collection on dorsal radial aspect distal to the IP joint spanning DIP without joint involvement    Complications:   None    Procedure and Technique:  After the patient, site, and procedure were identified, the patient was brought into the operating room in a supine position  General anaesthesia was provided  A well padded tourniquet was applied to the extremity, set at 250 mmHg    The  left upper extremity was then prepped and drapped in a normal, sterile, orthopedic fashion  A  longitudinal  incision is made in line with the left index  overlying the A1 pulley    Dissection was  carried down under loupe magnification  Skin and subcutaneous tissues were sharply incised  The tissue was elevated off the A1 pulley  The A1 pulley was  identified and incised  No Murky fluid was noted within the flexor tendon sheath  Next, the puncture wound  at the DIP joint volarly overlying the A5 pulley was bluntly dissected  A rent was   identified in the A3 pulley  The tendon was note to be intact  A 5 mm horixontal incision was made overlying the DIP joint volarly  A small longitudanal incision was made in the A5 pulley  The volar PIP laceration was bluntly dissected  A 5 Macedonian feeding tube was passed in a retrograde fashion from the incised A1 pulley  100 cc of sterile saline were utilized to irrigate the flexor tendon sheath from the A1 pulley to the A3 and A5 rents  Next the dorsal radial collection was addressed longitudinal incision was made centered on the DIP joint in the mid axial line  Upon incision of the skin purulent collection was noted this was bluntly explored and was tracking dorsally  All septations were broken up  A culture was obtained both aerobic and anaerobic  Marina Conrad The wound was debrided 1st with a Ray-Oh, and a curette  Some fascia soft tissue and skin was excised but no bone or muscle was debrided  The wound was copiously irrigated with sterile saline    At the completion of the procedure, hemostasis was obtained with cautery and direct pressure  The wounds were copiously irrigated with sterile solution  The wounds were closed with Nylon  Sterile dressings were applied, including Xeroform, Gauze and Finger Dressing  Please note, all sponge, needle, and instrument counts were correct prior to closure  Loupe magnification was utilized    The patient tolerated the procedure well      I was present for the entire procedure    Patient Disposition:  PACU     SIGNATURE: Luz Bales MD  DATE: 01/18/19  TIME: 6:20 PM

## 2019-01-19 LAB
BASOPHILS # BLD AUTO: 0.05 THOUSANDS/ΜL (ref 0–0.1)
BASOPHILS NFR BLD AUTO: 0 % (ref 0–1)
EOSINOPHIL # BLD AUTO: 0.1 THOUSAND/ΜL (ref 0–0.61)
EOSINOPHIL NFR BLD AUTO: 1 % (ref 0–6)
ERYTHROCYTE [DISTWIDTH] IN BLOOD BY AUTOMATED COUNT: 13.4 % (ref 11.6–15.1)
HCT VFR BLD AUTO: 42 % (ref 36.5–49.3)
HGB BLD-MCNC: 13.9 G/DL (ref 12–17)
IMM GRANULOCYTES # BLD AUTO: 0.05 THOUSAND/UL (ref 0–0.2)
IMM GRANULOCYTES NFR BLD AUTO: 0 % (ref 0–2)
LYMPHOCYTES # BLD AUTO: 2.4 THOUSANDS/ΜL (ref 0.6–4.47)
LYMPHOCYTES NFR BLD AUTO: 17 % (ref 14–44)
MCH RBC QN AUTO: 30.3 PG (ref 26.8–34.3)
MCHC RBC AUTO-ENTMCNC: 33.1 G/DL (ref 31.4–37.4)
MCV RBC AUTO: 92 FL (ref 82–98)
MONOCYTES # BLD AUTO: 0.99 THOUSAND/ΜL (ref 0.17–1.22)
MONOCYTES NFR BLD AUTO: 7 % (ref 4–12)
NEUTROPHILS # BLD AUTO: 10.79 THOUSANDS/ΜL (ref 1.85–7.62)
NEUTS SEG NFR BLD AUTO: 75 % (ref 43–75)
NRBC BLD AUTO-RTO: 0 /100 WBCS
PLATELET # BLD AUTO: 355 THOUSANDS/UL (ref 149–390)
PMV BLD AUTO: 9.5 FL (ref 8.9–12.7)
RBC # BLD AUTO: 4.59 MILLION/UL (ref 3.88–5.62)
VANCOMYCIN TROUGH SERPL-MCNC: 12.9 UG/ML (ref 10–20)
WBC # BLD AUTO: 14.38 THOUSAND/UL (ref 4.31–10.16)

## 2019-01-19 PROCEDURE — 99233 SBSQ HOSP IP/OBS HIGH 50: CPT | Performed by: INTERNAL MEDICINE

## 2019-01-19 PROCEDURE — 80202 ASSAY OF VANCOMYCIN: CPT | Performed by: INTERNAL MEDICINE

## 2019-01-19 PROCEDURE — 85025 COMPLETE CBC W/AUTO DIFF WBC: CPT | Performed by: INTERNAL MEDICINE

## 2019-01-19 PROCEDURE — 99024 POSTOP FOLLOW-UP VISIT: CPT | Performed by: SURGERY

## 2019-01-19 RX ORDER — KETOROLAC TROMETHAMINE 30 MG/ML
30 INJECTION, SOLUTION INTRAMUSCULAR; INTRAVENOUS EVERY 6 HOURS PRN
Status: DISPENSED | OUTPATIENT
Start: 2019-01-19 | End: 2019-01-22

## 2019-01-19 RX ADMIN — CEFTRIAXONE 1000 MG: 1 INJECTION, POWDER, FOR SOLUTION INTRAMUSCULAR; INTRAVENOUS at 11:29

## 2019-01-19 RX ADMIN — OXYCODONE HYDROCHLORIDE 5 MG: 5 TABLET ORAL at 15:57

## 2019-01-19 RX ADMIN — VANCOMYCIN HYDROCHLORIDE 1250 MG: 10 INJECTION, POWDER, LYOPHILIZED, FOR SOLUTION INTRAVENOUS at 19:40

## 2019-01-19 RX ADMIN — KETOROLAC TROMETHAMINE 30 MG: 30 INJECTION, SOLUTION INTRAMUSCULAR at 09:50

## 2019-01-19 RX ADMIN — VANCOMYCIN HYDROCHLORIDE 1250 MG: 10 INJECTION, POWDER, LYOPHILIZED, FOR SOLUTION INTRAVENOUS at 13:08

## 2019-01-19 RX ADMIN — OXYCODONE HYDROCHLORIDE 10 MG: 10 TABLET ORAL at 08:03

## 2019-01-19 RX ADMIN — HYDROMORPHONE HYDROCHLORIDE 0.5 MG: 1 INJECTION, SOLUTION INTRAMUSCULAR; INTRAVENOUS; SUBCUTANEOUS at 04:10

## 2019-01-19 RX ADMIN — HYDROMORPHONE HYDROCHLORIDE 0.5 MG: 1 INJECTION, SOLUTION INTRAMUSCULAR; INTRAVENOUS; SUBCUTANEOUS at 11:28

## 2019-01-19 RX ADMIN — VANCOMYCIN HYDROCHLORIDE 1250 MG: 10 INJECTION, POWDER, LYOPHILIZED, FOR SOLUTION INTRAVENOUS at 04:12

## 2019-01-19 RX ADMIN — SENNOSIDES AND DOCUSATE SODIUM 2 TABLET: 8.6; 5 TABLET ORAL at 08:03

## 2019-01-19 RX ADMIN — OXYCODONE HYDROCHLORIDE 10 MG: 10 TABLET ORAL at 03:12

## 2019-01-19 RX ADMIN — HYDROMORPHONE HYDROCHLORIDE 0.5 MG: 1 INJECTION, SOLUTION INTRAMUSCULAR; INTRAVENOUS; SUBCUTANEOUS at 19:06

## 2019-01-19 RX ADMIN — SODIUM CHLORIDE 100 ML/HR: 0.9 INJECTION, SOLUTION INTRAVENOUS at 11:29

## 2019-01-19 NOTE — UTILIZATION REVIEW
Initial Clinical Review    Admission: Date/Time/Statement: 1/18/19 @ 1117   [facility] This Encounter   Procedures    Inpatient Admission     Standing Status:   Standing     Number of Occurrences:   1     Order Specific Question:   Admitting Physician     Answer:   Corrinne Malay [[id number]]     Order Specific Question:   Level of Care     Answer:   Med Surg [16]     Order Specific Question:   Estimated length of stay     Answer:   More than 2 Midnights     Order Specific Question:   Certification     Answer:   I certify that inpatient services are medically necessary for this patient for a duration of greater than two midnights  See H&P and MD Progress Notes for additional information about the patient's course of treatment  ED: Date/Time/Mode of Arrival:   ED Arrival Information     Expected Arrival Acuity Means of Arrival Escorted By Service Admission Type    1/18/2019 1/18/2019 10:17 Urgent Ambulance [facility] General Medicine Urgent    Arrival Complaint    Flexor tenosynovitis of finger        Chief Complaint:   Chief Complaint   Patient presents with    Finger Injury     Pt transfered from [facility] due to a hand surgical consult  6 days ago, pt punctured 2nd finger of left hand with a screwdriver  History of Illness: 40 yo male to ED via EMS from [facility]  w/  left hand swelling, pain and redness  Patient sustained a puncture wound to the right index finger 5 days earlier with a screwdriver  He reports that he dug out a shard of metal from his right index finger yesterday but was unable to get the 2nd one out  He noticed swelling of the finger day following the injury  He did not seek medical attention at the time but soaked his hand in salt water and placed antibiotic ointment on the puncture wound    Swelling and pain got worse over the next few days and he noted purulent discharge from the puncture site as well as where he had dug out a metal shard from the finger  ED Vital Signs:   ED Triage Vitals   Temperature Pulse Respirations Blood Pressure SpO2   01/18/19 1031 01/18/19 1031 01/18/19 1031 01/18/19 1031 01/18/19 1031   97 9 °F (36 6 °C) 79 19 157/98 98 %      Temp Source Heart Rate Source Patient Position - Orthostatic VS BP Location FiO2 (%)   01/18/19 1031 01/18/19 1031 01/18/19 1334 01/18/19 1031 --   Oral Monitor Lying Right arm       Pain Score       01/18/19 1031       9        Wt Readings from Last 1 Encounters:   01/18/19 86 6 kg (190 lb 14 7 oz)     Vital Signs (abnormal): wnl   Pertinent Labs/Diagnostic Test Results: wbc  13 15  ED Treatment:   Medication Administration from 01/18/2019 0959 to 01/18/2019 1331       Date/Time Order Dose Route Action Action by Comments     01/18/2019 1132 senna-docusate sodium (SENOKOT S) 8 6-50 mg per tablet 2 tablet 2 tablet Oral Not Given Presley Lin RN      01/18/2019 1214 HYDROmorphone (DILAUDID) injection 0 5 mg 0 5 mg Intravenous Given Presley Lin RN      01/18/2019 1127 oxyCODONE (ROXICODONE) immediate release tablet 10 mg 10 mg Oral Given Presley Lin RN      01/18/2019 1214 vancomycin (VANCOCIN) 1,250 mg in sodium chloride 0 9 % 250 mL IVPB 1,250 mg Intravenous [address] Presley Lin RN      01/18/2019 1202 ceftriaxone (ROCEPHIN) 1 g/50 mL in dextrose IVPB 0 mg Intravenous Stopped Presley Lin RN      01/18/2019 1137 ceftriaxone (ROCEPHIN) 1 g/50 mL in dextrose IVPB 1,000 mg Intravenous New Bag Presley Lin RN         Past Medical/Surgical History:    Active Ambulatory Problems     Diagnosis Date Noted    Flexor tenosynovitis of finger 01/18/2019       Past Medical History:   Diagnosis Date    COPD (chronic obstructive pulmonary disease) ([facility] [address] )     Hyperlipidemia     Hypertension     Psychiatric disorder      Admitting Diagnosis: Hand injury [S69 90XA]  Flexor tenosynovitis of finger [M65 9]  Age/Sex: 39 y o  male  Assessment/Plan:   Elevated blood pressure reading   Assessment & Plan     · Systolic BP in the 065N to 160s at the time of presentation  Likely in the setting of acute pain  · He denies any medical history however review of his old records do show history of hypertension  · Patient not taking any medications prior to admission  · Monitor at this time  Expect improvement with adequate pain control  · Encourage outpatient follow-up with a primary care physician for routine care      * Flexor tenosynovitis of finger   Assessment & Plan     · Presents 5 days status post puncture wound to left index finger  · X-ray of the left hand showed "There is no acute fracture or dislocation  No significant degenerative changes  No suspicious appearing osseous lesions are seen  Soft tissue swelling of the index digit   There is a 4 mm round metallic foreign body in the volar soft tissues at the level of the distal metacarpal metaphysis  No radiopaque foreign body seen in the index digit"  · Start on IV antibiotics with vancomycin/Rocephin    Planned for OR later today per Orthopedic surgery  · Elevated extremity, pain control           Admission Orders:  Scheduled Meds:   Current Facility-Administered Medications:  acetaminophen 650 mg Oral Q6H PRN     cefTRIAXone 1,000 mg Intravenous Q24H  Last Rate: Stopped (01/18/19 1202)   HYDROmorphone 0 5 mg Intravenous Q3H PRN x2    ketorolac 30 mg Intravenous Q6H PRN x1    naloxone 0 04 mg Intravenous Q1MIN PRN     ondansetron 4 mg Intravenous Q8H PRN     oxyCODONE 5 mg Oral Q4H PRN     senna-docusate sodium 2 tablet Oral Daily     sodium chloride 100 mL/hr Intravenous Continuous  Last Rate: 100 mL/hr (01/18/19 8832)   vancomycin 15 mg/kg Intravenous Q8H  Last Rate: 1,250 mg (01/19/19 7132)     Reg diet   Elevate ext   I&O   Up and OOB as dara   Ortho sx consult   Splint   SCD  1/19  Bmp, vanco trough , cbc   Wound cx and gram stain     OrthPlan:   Assessment:  43 y o male with  left index finger cellulitis what appears to be an early flexor tenosynovitis as well as possibly a dorsal abscess   Plan  · Cont   abx per primary team  · Nonweight bearing to left upper extremity  · Heat to affected area  · Analgesics for pain  · OR today for irrigation debridement  · Consent obtained patient is agreeable  · He understands that he may develop significant stiffness following is infection due to adhesion formation and the flexor tendon sheath   o consult     OP note  1/18  Procedure(s):  Left Index Finger I&D/Washout (Left)

## 2019-01-19 NOTE — CONSULTS
ASSESSMENT/PLAN:      Right-hand-dominant male status post left index finger irrigation debridement flexor tendon sheath as well as dorsal abscess postop day 1  At this point no significant improvement  Will await cultures at this point positive for gram-positive cocci in clusters    Non weight-bearing left upper extremity encourage motion flexion extension with passive and active of the left upper extremity  Continue current antibiotic regiment until could be more specific following cultures  Will re-evaluate the patient tomorrow should he continued to fail to improve significantly may need more aggressive open irrigation debridement of that digit    The patient verbalized understanding of exam findings and treatment plan  We engaged in the shared decision-making process and treatment options were discussed at length with the patient  Surgical and conservative management discussed today along with risks and benefits       ____________________________________________________________________________________________________________________________________________      CHIEF COMPLAINT:  Chief Complaint   Patient presents with    Finger Injury     Pt transfered from [facility] due to a hand surgical consult  6 days ago, pt punctured 2nd finger of left hand with a screwdriver  SUBJECTIVE:  Loni Dye is a 39y o  year old right-hand-dominant male with left index finger tenosynovitis and a dorsal abscess  Patient was taken to the operating room emergently on the 18th of January for irrigation debridement  Cultures at this point growing out gram-positive cocci in clusters    Pain as well as persistent swelling is noted        I have personally reviewed all the relevant PMH, PSH, SH, FH, Medications and allergies       PAST MEDICAL HISTORY:  Past Medical History:   Diagnosis Date    COPD (chronic obstructive pulmonary disease) ([facility] [address] )     Hyperlipidemia     Hypertension     Psychiatric disorder bipolar       PAST SURGICAL HISTORY:  Past Surgical History:   Procedure Laterality Date    HERNIA REPAIR      TUMOR REMOVAL      groin and shoulder       FAMILY HISTORY:  History reviewed  No pertinent family history  SOCIAL HISTORY:  Social History   Substance Use Topics    Smoking status: Current Every Day Smoker     Packs/day: 1 50     Types: Cigarettes    Smokeless tobacco: Never Used    Alcohol use No       MEDICATIONS:    Current Facility-Administered Medications:     acetaminophen (TYLENOL) tablet 650 mg, 650 mg, Oral, Q6H PRN, Tyler Waite MD    ceftriaxone (ROCEPHIN) 1 g/50 mL in dextrose IVPB, 1,000 mg, Intravenous, Q24H, Tyler Waite MD, Last Rate: 100 mL/hr at 01/19/19 1129, 1,000 mg at 01/19/19 1129    HYDROmorphone (DILAUDID) injection 0 5 mg, 0 5 mg, Intravenous, Q3H PRN, Tyler Waite MD, 0 5 mg at 01/19/19 1128    ketorolac (TORADOL) injection 30 mg, 30 mg, Intravenous, Q6H PRN, Tyler Waite MD, 30 mg at 01/19/19 0950    naloxone (NARCAN) 0 04 mg/mL syringe 0 04 mg, 0 04 mg, Intravenous, Q1MIN PRN, Tyler Waite MD    ondansetron (ZOFRAN) injection 4 mg, 4 mg, Intravenous, Q8H PRN, Tyler Waite MD    oxyCODONE (ROXICODONE) IR tablet 5 mg, 5 mg, Oral, Q4H PRN, Tyler Waite MD    senna-docusate sodium (SENOKOT S) 8 6-50 mg per tablet 2 tablet, 2 tablet, Oral, Daily, Tyler Waite MD, 2 tablet at 01/19/19 0803    sodium chloride 0 9 % infusion, 100 mL/hr, Intravenous, Continuous, Leatha Hudson CRNA, Last Rate: 100 mL/hr at 01/19/19 1129, 100 mL/hr at 01/19/19 1129    vancomycin (VANCOCIN) 1,250 mg in sodium chloride 0 9 % 250 mL IVPB, 15 mg/kg, Intravenous, Q8H, Tyler Waite MD, Last Rate: 166 7 mL/hr at 01/19/19 0412, 1,250 mg at 01/19/19 0412    ALLERGIES:  No Known Allergies    REVIEW OF SYSTEMS:  Review of Systems   Constitutional: Negative for activity change, chills, fever and unexpected weight change  HENT: Negative for trouble swallowing and voice change      Eyes: Negative for pain and visual disturbance  Respiratory: Negative for shortness of breath and wheezing  Cardiovascular: Negative for chest pain, palpitations and leg swelling  Gastrointestinal: Negative for abdominal pain, nausea and vomiting  Endocrine: Negative for polydipsia and polyuria  Genitourinary: Negative for dysuria and hematuria  Musculoskeletal: Positive for arthralgias and joint swelling  Negative for myalgias  Skin: Positive for wound  Negative for rash  Neurological: Negative for dizziness, numbness and headaches  Psychiatric/Behavioral: Negative for decreased concentration and suicidal ideas  The patient is not nervous/anxious  VITALS:  Vitals:    01/19/19 0803   BP: 122/68   Pulse: 68   Resp: 18   Temp: 97 5 °F (36 4 °C)   SpO2: 98%       LABS:  HgA1c: No results found for: HGBA1C  BMP:   Lab Results   Component Value Date    GLUCOSE 93 06/19/2014    CALCIUM 9 1 01/18/2019     06/19/2014    K 4 2 01/18/2019    CO2 30 01/18/2019     01/18/2019    BUN 21 01/18/2019    CREATININE 0 81 01/18/2019       _____________________________________________________  PHYSICAL EXAMINATION:  General: well developed and well nourished, alert, oriented times 3 and appears comfortable  Psychiatric: Normal  HEENT: Normocephalic, Atraumatic Trachea Midline, No torticollis  Pulmonary: No audible wheezing or respiratory distress   Cardiovascular: No pitting edema, 2+ radial pulse   Skin: Erthyema more localized to the dorsum of the finger  Neurovascular: Sensation Intact to the Median, Ulnar, Radial Nerve, Motor Intact to the Median, Ulnar, Radial Nerve and Pulses Intact  Musculoskeletal: Normal, except as noted in detailed exam and in HPI        MUSCULOSKELETAL EXAMINATION:  Surgical incision sites particularly the volar once appear to be without erythema or drainage  Dorsal into site does have a bit more swelling particularly at the central sinus site  No gross purulence noted  No significant drainage    ___________________________________________________  STUDIES REVIEWED:  No new imaging to review

## 2019-01-19 NOTE — ASSESSMENT & PLAN NOTE
· Presents 5 days status post puncture wound to left index finger  · X-ray of the left hand showed "There is no acute fracture or dislocation  No significant degenerative changes  No suspicious appearing osseous lesions are seen  Soft tissue swelling of the index digit  There is a 4 mm round metallic foreign body in the volar soft tissues at the level of the distal metacarpal metaphysis  No radiopaque foreign body seen in the index digit"  · He is status post left index finger I&D/washout on 1/18/19  Wound cultures growing gram-positive cocci in clusters  · Continue IV antibiotics with vancomycin, discontinue IV Rocephin  · Vanco trough with next dose    Dose adjustment per pharmacy protocol  · Elevate extremity, pain control

## 2019-01-19 NOTE — ASSESSMENT & PLAN NOTE
· Systolic BP in the 573P to 160s at the time of presentation    Likely in the setting of acute pain  · He denies any medical history however review of his old records do show history of hypertension  · Patient not taking any medications prior to admission  · Blood pressure improved with pain control  · Encouraged outpatient follow-up with a primary care physician for routine care following discharge

## 2019-01-19 NOTE — PROGRESS NOTES
Progress Note - Nadine Obrien 1975, 39 y o  male MRN: [id number]    Unit/Bed#: -01 Encounter: [id number]    Primary Care Provider: No primary care provider on file  Date and time admitted to hospital: 1/18/2019 10:17 AM      * Flexor tenosynovitis of finger   Assessment & Plan    · Presents 5 days status post puncture wound to left index finger  · X-ray of the left hand showed "There is no acute fracture or dislocation  No significant degenerative changes  No suspicious appearing osseous lesions are seen  Soft tissue swelling of the index digit  There is a 4 mm round metallic foreign body in the volar soft tissues at the level of the distal metacarpal metaphysis  No radiopaque foreign body seen in the index digit"  · He is status post left index finger I&D/washout on 1/18/19  Wound cultures growing gram-positive cocci in clusters  · Continue IV antibiotics with vancomycin, discontinue IV Rocephin  · Vanco trough with next dose  Dose adjustment per pharmacy protocol  · Elevate extremity, pain control     Elevated blood pressure reading   Assessment & Plan    · Systolic BP in the 319Y to 160s at the time of presentation  Likely in the setting of acute pain  · He denies any medical history however review of his old records do show history of hypertension  · Patient not taking any medications prior to admission  · Blood pressure improved with pain control  · Encouraged outpatient follow-up with a primary care physician for routine care following discharge       VTE Pharmacologic Prophylaxis:   Pharmacologic: Low risk, ambulate    Patient Centered Rounds: I have performed bedside rounds with nursing staff today      Discussions with Specialists or Other Care Team Provider:  Nursing    Education and Discussions with Family / Patient:  Patient    Current Length of Stay: 1 day(s)    Current Patient Status: Inpatient   Certification Statement: The patient will continue to require additional inpatient hospital stay due to Above diagnosis and care plan    Discharge Plan:  Home once cleared from the orthopedic standpoint    Code Status: Level 3 - DNAR and DNI      Subjective:   No new complaints  He reports left hand pain is slightly improved from initial presentation  He denies any fever or chills  Concerned about swelling around his left Knuckles  Patient encouraged to continue straightening exercises of left hand    Objective:     Vitals:   Temp (24hrs), Av 9 °F (36 6 °C), Min:97 3 °F (36 3 °C), Max:99 °F (37 2 °C)    Temp:  [97 3 °F (36 3 °C)-99 °F (37 2 °C)] 97 5 °F (36 4 °C)  HR:  [59-79] 68  Resp:  [16-19] 18  BP: ()/(51-98) 122/68  SpO2:  [94 %-99 %] 98 %  Body mass index is 22 06 kg/m²  Input and Output Summary (last 24 hours):        Intake/Output Summary (Last 24 hours) at 19 6582  Last data filed at 19 1804   Gross per 24 hour   Intake              650 ml   Output                0 ml   Net              650 ml       Physical Exam:  General Appearance:    Alert, cooperative, no distress, appropriately responsive    Head:    Normocephalic, without obvious abnormality, atraumatic, mucous membranes moist    Eyes:    Conjunctiva/corneas clear, EOM's intact   Neck:   Supple   Lungs:     Clear to auscultation bilaterally, respirations unlabored, no crackles or wheeze     Heart:    Regular rate and rhythm, S1 and S2    Abdomen:     Soft, non-tender, bowel sounds active all four quadrants,     no masses, no organomegaly   Extremities:   No lower extremity edema, left index finger dressing intact, left hand swelling/erythema improved, decreased range of motion to left index finger   Neurologic:  nonfocal         Additional Data:     Labs:      Results from last 7 days  Lab Units 19  0509   WBC Thousand/uL 13 15*   HEMOGLOBIN g/dL 14 8   HEMATOCRIT % 45 0   PLATELETS Thousands/uL 345   NEUTROS PCT % 67   LYMPHS PCT % 20   MONOS PCT % 9   EOS PCT % 3       Results from last 7 days  Lab Units 01/18/19  0509   POTASSIUM mmol/L 4 2   CHLORIDE mmol/L 105   CO2 mmol/L 30   BUN mg/dL 21   CREATININE mg/dL 0 81   CALCIUM mg/dL 9 1       Results from last 7 days  Lab Units 01/18/19  0509   INR  0 94       * I Have Reviewed All Lab Data Listed Above  * Additional Pertinent Lab Tests Reviewed: [address] Admission Reviewed    Cultures:   Blood Culture: No results found for: BLOODCX  Urine Culture: No results found for: URINECX  Sputum Culture: No components found for: SPUTUMCX  Wound Culture: No results found for: WOUNDCULT    Last 24 Hours Medication List:     Current Facility-Administered Medications:  acetaminophen 650 mg Oral Q6H PRN [facility], MD    cefTRIAXone 1,000 mg Intravenous Q24H Burnetta Place, MD Last Rate: Stopped (01/18/19 1202)   HYDROmorphone 0 5 mg Intravenous Q3H PRN [facility], MD    ketorolac 30 mg Intravenous Q6H PRN [place] Place, MD    naloxone 0 04 mg Intravenous Q1MIN PRN [place] Place, MD    ondansetron 4 mg Intravenous Q8H PRN [place], MD    oxyCODONE 5 mg Oral Q4H PRN [place], MD    senna-docusate sodium 2 tablet Oral Daily Paola Rojas, MD    sodium chloride 100 mL/hr Intravenous Continuous Baldomero Rosario CRNA Last Rate: 100 mL/hr (01/18/19 0492)   vancomycin 15 mg/kg Intravenous Marisela Huff MD Last Rate: 1,250 mg (01/19/19 3862)        Today, Patient Was Seen By: Paola Rojas MD    ** Please Note: Dragon 360 Dictation voice to text software may have been used in the creation of this document   **

## 2019-01-20 PROBLEM — L02.512 ABSCESS OF FINGER OF LEFT HAND: Status: ACTIVE | Noted: 2019-01-18

## 2019-01-20 LAB
ANION GAP SERPL CALCULATED.3IONS-SCNC: 7 MMOL/L (ref 4–13)
BACTERIA SPEC ANAEROBE CULT: NORMAL
BACTERIA WND AEROBE CULT: ABNORMAL
BUN SERPL-MCNC: 20 MG/DL (ref 5–25)
CALCIUM SERPL-MCNC: 8.5 MG/DL (ref 8.3–10.1)
CHLORIDE SERPL-SCNC: 108 MMOL/L (ref 100–108)
CO2 SERPL-SCNC: 26 MMOL/L (ref 21–32)
CREAT SERPL-MCNC: 0.79 MG/DL (ref 0.6–1.3)
GFR SERPL CREATININE-BSD FRML MDRD: 109 ML/MIN/1.73SQ M
GLUCOSE SERPL-MCNC: 101 MG/DL (ref 65–140)
GRAM STN SPEC: ABNORMAL
GRAM STN SPEC: ABNORMAL
POTASSIUM SERPL-SCNC: 4.3 MMOL/L (ref 3.5–5.3)
SODIUM SERPL-SCNC: 141 MMOL/L (ref 136–145)

## 2019-01-20 PROCEDURE — 99024 POSTOP FOLLOW-UP VISIT: CPT | Performed by: SURGERY

## 2019-01-20 PROCEDURE — 80048 BASIC METABOLIC PNL TOTAL CA: CPT | Performed by: INTERNAL MEDICINE

## 2019-01-20 PROCEDURE — 99232 SBSQ HOSP IP/OBS MODERATE 35: CPT | Performed by: INTERNAL MEDICINE

## 2019-01-20 RX ADMIN — OXYCODONE HYDROCHLORIDE 5 MG: 5 TABLET ORAL at 00:57

## 2019-01-20 RX ADMIN — KETOROLAC TROMETHAMINE 30 MG: 30 INJECTION, SOLUTION INTRAMUSCULAR at 09:37

## 2019-01-20 RX ADMIN — OXYCODONE HYDROCHLORIDE 5 MG: 5 TABLET ORAL at 06:00

## 2019-01-20 RX ADMIN — HYDROMORPHONE HYDROCHLORIDE 0.5 MG: 1 INJECTION, SOLUTION INTRAMUSCULAR; INTRAVENOUS; SUBCUTANEOUS at 03:37

## 2019-01-20 RX ADMIN — SODIUM CHLORIDE 100 ML/HR: 0.9 INJECTION, SOLUTION INTRAVENOUS at 12:21

## 2019-01-20 RX ADMIN — HYDROMORPHONE HYDROCHLORIDE 0.5 MG: 1 INJECTION, SOLUTION INTRAMUSCULAR; INTRAVENOUS; SUBCUTANEOUS at 12:26

## 2019-01-20 RX ADMIN — VANCOMYCIN HYDROCHLORIDE 1250 MG: 10 INJECTION, POWDER, LYOPHILIZED, FOR SOLUTION INTRAVENOUS at 03:26

## 2019-01-20 RX ADMIN — HYDROMORPHONE HYDROCHLORIDE 0.5 MG: 1 INJECTION, SOLUTION INTRAMUSCULAR; INTRAVENOUS; SUBCUTANEOUS at 09:08

## 2019-01-20 RX ADMIN — KETOROLAC TROMETHAMINE 30 MG: 30 INJECTION, SOLUTION INTRAMUSCULAR at 17:22

## 2019-01-20 RX ADMIN — OXYCODONE HYDROCHLORIDE 5 MG: 5 TABLET ORAL at 20:12

## 2019-01-20 RX ADMIN — VANCOMYCIN HYDROCHLORIDE 1250 MG: 10 INJECTION, POWDER, LYOPHILIZED, FOR SOLUTION INTRAVENOUS at 20:09

## 2019-01-20 RX ADMIN — HYDROMORPHONE HYDROCHLORIDE 0.5 MG: 1 INJECTION, SOLUTION INTRAMUSCULAR; INTRAVENOUS; SUBCUTANEOUS at 19:12

## 2019-01-20 RX ADMIN — VANCOMYCIN HYDROCHLORIDE 1250 MG: 10 INJECTION, POWDER, LYOPHILIZED, FOR SOLUTION INTRAVENOUS at 12:21

## 2019-01-20 RX ADMIN — HYDROMORPHONE HYDROCHLORIDE 0.5 MG: 1 INJECTION, SOLUTION INTRAMUSCULAR; INTRAVENOUS; SUBCUTANEOUS at 22:57

## 2019-01-20 NOTE — PROGRESS NOTES
Progress Note - Orthopedics   Laila Roy 39 y o  male MRN: [id number]  Unit/Bed#: -01      Subjective:    39 y o male s/p left index finger irrigation & debridement for flexor tendosynovitis  He reports no improvement and continues to experinece pain in the finger  Cultures returned MRSA, pt on contact precautions  Denies fevers chills, CP, SOB       Labs:    0  Lab Value Date/Time   HCT 42 0 01/19/2019 1137   HCT 45 0 01/18/2019 0509   HCT 40 4 04/07/2018 0640   HCT 50 7 (H) 06/19/2014 0950   HGB 13 9 01/19/2019 1137   HGB 14 8 01/18/2019 0509   HGB 13 9 04/07/2018 0640   HGB 18 3 (H) 06/19/2014 0950   INR 0 94 01/18/2019 0509   INR 1 09 06/19/2014 0950   WBC 14 38 (H) 01/19/2019 1137   WBC 13 15 (H) 01/18/2019 0509   WBC 13 67 (H) 04/07/2018 0640   WBC 14 27 (H) 06/19/2014 0950       Meds:    Current Facility-Administered Medications:     acetaminophen (TYLENOL) tablet 650 mg, 650 mg, Oral, Q6H PRN, Davin Krishna MD    ceftriaxone (ROCEPHIN) 1 g/50 mL in dextrose IVPB, 1,000 mg, Intravenous, Q24H, Davin Krishna MD, Last Rate: 100 mL/hr at 01/19/19 1129, 1,000 mg at 01/19/19 1129    HYDROmorphone (DILAUDID) injection 0 5 mg, 0 5 mg, Intravenous, Q3H PRN, Davin Krishna MD, 0 5 mg at 01/20/19 0337    ketorolac (TORADOL) injection 30 mg, 30 mg, Intravenous, Q6H PRN, Davin Krishna MD, 30 mg at 01/19/19 0950    naloxone (NARCAN) 0 04 mg/mL syringe 0 04 mg, 0 04 mg, Intravenous, Q1MIN PRN, Davin Krishna MD    ondansetron (ZOFRAN) injection 4 mg, 4 mg, Intravenous, Q8H PRN, Davin Krishna MD    oxyCODONE (ROXICODONE) IR tablet 5 mg, 5 mg, Oral, Q4H PRN, Davin Krishna MD, 5 mg at 01/20/19 0600    senna-docusate sodium (SENOKOT S) 8 6-50 mg per tablet 2 tablet, 2 tablet, Oral, Daily, Davin Krishna MD, 2 tablet at 01/19/19 0803    sodium chloride 0 9 % infusion, 100 mL/hr, Intravenous, Continuous, Ej Cowart CRNA, Last Rate: 100 mL/hr at 01/19/19 1129, 100 mL/hr at 01/19/19 1129    vancomycin (VANCOCIN) 1,250 mg in sodium chloride 0 9 % 250 mL IVPB, 15 mg/kg, Intravenous, Q8H, Torrey Sparks MD, Last Rate: 166 7 mL/hr at 01/20/19 0326, 1,250 mg at 01/20/19 0326    Blood Culture:   No results found for: BLOODCX    Wound Culture:   Lab Results   Component Value Date    WOUNDCULT (A) 01/18/2019     3+ Growth of Methicillin Resistant Staphylococcus aureus       Ins and Outs:  I/O last 24 hours: In: 1161 7 [I V :1161 7]  Out: -           Physical:  Vitals:    01/20/19 0700   BP: 114/60   Pulse: 68   Resp: 18   Temp: 98 °F (36 7 °C)   SpO2: 98%     Musculoskeletal: left Upper Extremity  · Erythema over the dorsum of the finger  · Surgical wound with some purulent drainage able to be expelled   · Dressing intact, drainage noted  · TTP over dorsum of the finger and surgical sites  · SILT m/r/u  Motor intact ain/pin/m/r/u, 2+ rad pulse      Assessment:    43 y o male s/p left index finger irrigation & debridement for flexor tendosynovitis       Plan:  · Plan for OR tomorrow for additional washout - NPO at midnight, pt not on blood thinners   · TID soaks - 1:4 betadine and warm NS for 20 minutes, nursing order placed  · Patient to try and maintain active flexion and extension of the fingers   · Pain control as needed     Alvina Carvalho PA-C

## 2019-01-20 NOTE — ASSESSMENT & PLAN NOTE
· Presents 5 days status post puncture wound to left index finger  · X-ray of the left hand showed "There is no acute fracture or dislocation  No significant degenerative changes  No suspicious appearing osseous lesions are seen  Soft tissue swelling of the index digit  There is a 4 mm round metallic foreign body in the volar soft tissues at the level of the distal metacarpal metaphysis  No radiopaque foreign body seen in the index digit"  · He is status post left index finger I&D/washout on 1/18/19  Wound cultures growing MRSA  · Continue IV vancomycin  · Vanco trough with next dose    Dose adjustment per pharmacy protocol  · I&D per Ortho tomorrow

## 2019-01-20 NOTE — ASSESSMENT & PLAN NOTE
· Systolic BP in the 979X to 160s at the time of presentation    Likely in the setting of acute pain  · He denies any medical history however review of his old records do show history of hypertension  · Patient not taking any medications prior to admission  · Blood pressure improved with pain control  · Encouraged outpatient follow-up with a primary care physician for routine care following discharge

## 2019-01-20 NOTE — PROGRESS NOTES
Progress Note - Melissa Pfeiffer 1975, 39 y o  male MRN: [id number]    Unit/Bed#: -01 Encounter: [id number]    Primary Care Provider: No primary care provider on file  Date and time admitted to hospital: 1/18/2019 10:17 AM        * Flexor tenosynovitis of finger   Assessment & Plan    · Presents 5 days status post puncture wound to left index finger  · X-ray of the left hand showed "There is no acute fracture or dislocation  No significant degenerative changes  No suspicious appearing osseous lesions are seen  Soft tissue swelling of the index digit  There is a 4 mm round metallic foreign body in the volar soft tissues at the level of the distal metacarpal metaphysis  No radiopaque foreign body seen in the index digit"  · He is status post left index finger I&D/washout on 1/18/19  Wound cultures growing MRSA  · Continue IV vancomycin  · Vanco trough with next dose  Dose adjustment per pharmacy protocol  · I&D per Ortho tomorrow     Elevated blood pressure reading   Assessment & Plan    · Systolic BP in the 275E to 160s at the time of presentation  Likely in the setting of acute pain  · He denies any medical history however review of his old records do show history of hypertension  · Patient not taking any medications prior to admission  · Blood pressure improved with pain control  · Encouraged outpatient follow-up with a primary care physician for routine care following discharge       VTE Pharmacologic Prophylaxis:   Pharmacologic: Low risk ambulate  Mechanical VTE Prophylaxis in Place: Yes    Patient Centered Rounds: I have performed bedside rounds with nursing staff today  Discussions with Specialists or Other Care Team Provider:  Nursing    Education and Discussions with Family / Patient:  Patient    Time Spent for Care: 20 minutes  More than 50% of total time spent on counseling and coordination of care as described above      Current Length of Stay: 2 day(s)    Current Patient Status: Inpatient   Certification Statement: The patient will continue to require additional inpatient hospital stay due to IV antibiotics    Discharge Plan:  Pending clearance by Orthopedics    Code Status: Level 3 - DNAR and DNI      Subjective:   Feels okay today  Pain is controlled    Objective:     Vitals:   Temp (24hrs), Av 9 °F (36 6 °C), Min:97 5 °F (36 4 °C), Max:98 3 °F (36 8 °C)    Temp:  [97 5 °F (36 4 °C)-98 3 °F (36 8 °C)] 98 °F (36 7 °C)  HR:  [60-68] 68  Resp:  [18] 18  BP: ()/(52-72) 114/60  SpO2:  [95 %-98 %] 98 %  Body mass index is 22 06 kg/m²  Input and Output Summary (last 24 hours): Intake/Output Summary (Last 24 hours) at 19 1444  Last data filed at 19 1221   Gross per 24 hour   Intake             1000 ml   Output                0 ml   Net             1000 ml       Physical Exam:     Physical Exam     Gen -Patient comfortable   Neck- Supple  No thyromegaly or lymphadenopathy  Lungs-Clear bilaterally without any wheeze or rales   Heart S1-S2, regular rate and rhythm, no murmurs  Abdomen-soft nontender, no organomegaly  Bowel sounds present  Extremities-no cyanosi,  clubbing or edema  Skin- finger dressing intact  Neuro-nonfocal     Additional Data:     Labs:      Results from last 7 days  Lab Units 19  1137   WBC Thousand/uL 14 38*   HEMOGLOBIN g/dL 13 9   HEMATOCRIT % 42 0   PLATELETS Thousands/uL 355   NEUTROS PCT % 75   LYMPHS PCT % 17   MONOS PCT % 7   EOS PCT % 1       Results from last 7 days  Lab Units 19  0558   SODIUM mmol/L 141   POTASSIUM mmol/L 4 3   CHLORIDE mmol/L 108   CO2 mmol/L 26   BUN mg/dL 20   CREATININE mg/dL 0 79   ANION GAP mmol/L 7   CALCIUM mg/dL 8 5   GLUCOSE RANDOM mg/dL 101       Results from last 7 days  Lab Units 19  0509   INR  0 94                       * I Have Reviewed All Lab Data Listed Above  * Additional Pertinent Lab Tests Reviewed:  All Labs Within Last 24 Hours Reviewed    Imaging:    Imaging Reports Reviewed Today Include:   Imaging Personally Reviewed by Myself Includes:      Recent Cultures (last 7 days):       Results from last 7 days  Lab Units 01/18/19  0098   GRAM STAIN RESULT  Rare Polys  Rare Gram positive cocci in clusters   WOUND CULTURE  3+ Growth of Methicillin Resistant Staphylococcus aureus*       Last 24 Hours Medication List:     Current Facility-Administered Medications:  acetaminophen 650 mg Oral Q6H PRN Mertie Drivers, MD    HYDROmorphone 0 5 mg Intravenous Q3H PRN Mertie Drivers, MD    ketorolac 30 mg Intravenous Q6H PRN Mertie Drivers, MD    naloxone 0 04 mg Intravenous Q1MIN PRN Reba Drivers, MD    ondansetron 4 mg Intravenous Q8H PRN Krupatie Drivers, MD    oxyCODONE 5 mg Oral Q4H PRN Mertie Drivers, MD    senna-docusate sodium 2 tablet Oral Daily Reba Nicholas, MD    sodium chloride 100 mL/hr Intravenous Continuous Rupali Jeffries CRNA Last Rate: 100 mL/hr (01/20/19 1221)   vancomycin 15 mg/kg Intravenous Q8H Reba Nicholas MD Last Rate: 1,250 mg (01/20/19 1221)        Today, Patient Was Seen By: Warren Moon MD    ** Please Note: Dictation voice to text software may have been used in the creation of this document   **

## 2019-01-21 ENCOUNTER — ANESTHESIA (INPATIENT)
Dept: PERIOP | Facility: HOSPITAL | Age: 44
DRG: 316 | End: 2019-01-21
Payer: COMMERCIAL

## 2019-01-21 ENCOUNTER — ANESTHESIA EVENT (INPATIENT)
Dept: PERIOP | Facility: HOSPITAL | Age: 44
DRG: 316 | End: 2019-01-21
Payer: COMMERCIAL

## 2019-01-21 LAB
ANION GAP SERPL CALCULATED.3IONS-SCNC: 9 MMOL/L (ref 4–13)
BUN SERPL-MCNC: 19 MG/DL (ref 5–25)
CALCIUM SERPL-MCNC: 8.7 MG/DL (ref 8.3–10.1)
CHLORIDE SERPL-SCNC: 107 MMOL/L (ref 100–108)
CO2 SERPL-SCNC: 20 MMOL/L (ref 21–32)
CREAT SERPL-MCNC: 0.7 MG/DL (ref 0.6–1.3)
ERYTHROCYTE [DISTWIDTH] IN BLOOD BY AUTOMATED COUNT: 12.8 % (ref 11.6–15.1)
GFR SERPL CREATININE-BSD FRML MDRD: 115 ML/MIN/1.73SQ M
GLUCOSE SERPL-MCNC: 98 MG/DL (ref 65–140)
HCT VFR BLD AUTO: 41.1 % (ref 36.5–49.3)
HGB BLD-MCNC: 13.6 G/DL (ref 12–17)
MCH RBC QN AUTO: 30.3 PG (ref 26.8–34.3)
MCHC RBC AUTO-ENTMCNC: 33.1 G/DL (ref 31.4–37.4)
MCV RBC AUTO: 92 FL (ref 82–98)
PLATELET # BLD AUTO: 319 THOUSANDS/UL (ref 149–390)
PMV BLD AUTO: 9.7 FL (ref 8.9–12.7)
POTASSIUM SERPL-SCNC: 4.5 MMOL/L (ref 3.5–5.3)
RBC # BLD AUTO: 4.49 MILLION/UL (ref 3.88–5.62)
SODIUM SERPL-SCNC: 136 MMOL/L (ref 136–145)
WBC # BLD AUTO: 9.55 THOUSAND/UL (ref 4.31–10.16)

## 2019-01-21 PROCEDURE — 26011 DRAINAGE OF FINGER ABSCESS: CPT | Performed by: SURGERY

## 2019-01-21 PROCEDURE — 99232 SBSQ HOSP IP/OBS MODERATE 35: CPT | Performed by: INTERNAL MEDICINE

## 2019-01-21 PROCEDURE — 99024 POSTOP FOLLOW-UP VISIT: CPT | Performed by: SURGERY

## 2019-01-21 PROCEDURE — 0JBK0ZZ EXCISION OF LEFT HAND SUBCUTANEOUS TISSUE AND FASCIA, OPEN APPROACH: ICD-10-PCS | Performed by: SURGERY

## 2019-01-21 PROCEDURE — 11043 DBRDMT MUSC&/FSCA 1ST 20/<: CPT | Performed by: SURGERY

## 2019-01-21 PROCEDURE — 85027 COMPLETE CBC AUTOMATED: CPT | Performed by: INTERNAL MEDICINE

## 2019-01-21 PROCEDURE — 80048 BASIC METABOLIC PNL TOTAL CA: CPT | Performed by: INTERNAL MEDICINE

## 2019-01-21 RX ORDER — HYDROMORPHONE HCL/PF 1 MG/ML
0.2 SYRINGE (ML) INJECTION
Status: DISCONTINUED | OUTPATIENT
Start: 2019-01-21 | End: 2019-01-21 | Stop reason: HOSPADM

## 2019-01-21 RX ORDER — FENTANYL CITRATE 50 UG/ML
INJECTION, SOLUTION INTRAMUSCULAR; INTRAVENOUS AS NEEDED
Status: DISCONTINUED | OUTPATIENT
Start: 2019-01-21 | End: 2019-01-21 | Stop reason: SURG

## 2019-01-21 RX ORDER — FENTANYL CITRATE/PF 50 MCG/ML
25 SYRINGE (ML) INJECTION
Status: DISCONTINUED | OUTPATIENT
Start: 2019-01-21 | End: 2019-01-21 | Stop reason: HOSPADM

## 2019-01-21 RX ORDER — ONDANSETRON 2 MG/ML
4 INJECTION INTRAMUSCULAR; INTRAVENOUS ONCE AS NEEDED
Status: DISCONTINUED | OUTPATIENT
Start: 2019-01-21 | End: 2019-01-21 | Stop reason: HOSPADM

## 2019-01-21 RX ORDER — MAGNESIUM HYDROXIDE 1200 MG/15ML
LIQUID ORAL AS NEEDED
Status: DISCONTINUED | OUTPATIENT
Start: 2019-01-21 | End: 2019-01-21 | Stop reason: HOSPADM

## 2019-01-21 RX ORDER — MIDAZOLAM HYDROCHLORIDE 1 MG/ML
INJECTION INTRAMUSCULAR; INTRAVENOUS AS NEEDED
Status: DISCONTINUED | OUTPATIENT
Start: 2019-01-21 | End: 2019-01-21 | Stop reason: SURG

## 2019-01-21 RX ORDER — KETAMINE HCL IN NACL, ISO-OSM 100MG/10ML
SYRINGE (ML) INJECTION AS NEEDED
Status: DISCONTINUED | OUTPATIENT
Start: 2019-01-21 | End: 2019-01-21 | Stop reason: SURG

## 2019-01-21 RX ORDER — PROPOFOL 10 MG/ML
INJECTION, EMULSION INTRAVENOUS AS NEEDED
Status: DISCONTINUED | OUTPATIENT
Start: 2019-01-21 | End: 2019-01-21 | Stop reason: SURG

## 2019-01-21 RX ORDER — FENTANYL CITRATE/PF 50 MCG/ML
50 SYRINGE (ML) INJECTION
Status: DISCONTINUED | OUTPATIENT
Start: 2019-01-21 | End: 2019-01-21 | Stop reason: HOSPADM

## 2019-01-21 RX ORDER — DEXMEDETOMIDINE HYDROCHLORIDE 100 UG/ML
INJECTION, SOLUTION INTRAVENOUS AS NEEDED
Status: DISCONTINUED | OUTPATIENT
Start: 2019-01-21 | End: 2019-01-21 | Stop reason: SURG

## 2019-01-21 RX ORDER — ONDANSETRON 2 MG/ML
INJECTION INTRAMUSCULAR; INTRAVENOUS AS NEEDED
Status: DISCONTINUED | OUTPATIENT
Start: 2019-01-21 | End: 2019-01-21 | Stop reason: SURG

## 2019-01-21 RX ORDER — HYDROMORPHONE HCL/PF 1 MG/ML
0.5 SYRINGE (ML) INJECTION
Status: COMPLETED | OUTPATIENT
Start: 2019-01-21 | End: 2019-01-21

## 2019-01-21 RX ORDER — LORAZEPAM 0.5 MG/1
0.5 TABLET ORAL EVERY 6 HOURS PRN
Status: DISCONTINUED | OUTPATIENT
Start: 2019-01-21 | End: 2019-01-23 | Stop reason: HOSPADM

## 2019-01-21 RX ADMIN — HYDROMORPHONE HYDROCHLORIDE 0.5 MG: 1 INJECTION, SOLUTION INTRAMUSCULAR; INTRAVENOUS; SUBCUTANEOUS at 19:17

## 2019-01-21 RX ADMIN — VANCOMYCIN HYDROCHLORIDE 1250 MG: 10 INJECTION, POWDER, LYOPHILIZED, FOR SOLUTION INTRAVENOUS at 04:30

## 2019-01-21 RX ADMIN — FENTANYL CITRATE 25 MCG: 50 INJECTION INTRAMUSCULAR; INTRAVENOUS at 18:01

## 2019-01-21 RX ADMIN — FENTANYL CITRATE 25 MCG: 50 INJECTION INTRAMUSCULAR; INTRAVENOUS at 18:05

## 2019-01-21 RX ADMIN — KETOROLAC TROMETHAMINE 30 MG: 30 INJECTION, SOLUTION INTRAMUSCULAR at 09:25

## 2019-01-21 RX ADMIN — DEXMEDETOMIDINE HYDROCHLORIDE 12 MCG: 100 INJECTION, SOLUTION INTRAVENOUS at 18:04

## 2019-01-21 RX ADMIN — HYDROMORPHONE HYDROCHLORIDE 0.5 MG: 1 INJECTION, SOLUTION INTRAMUSCULAR; INTRAVENOUS; SUBCUTANEOUS at 19:22

## 2019-01-21 RX ADMIN — KETOROLAC TROMETHAMINE 30 MG: 30 INJECTION, SOLUTION INTRAMUSCULAR at 04:34

## 2019-01-21 RX ADMIN — HYDROMORPHONE HYDROCHLORIDE 0.5 MG: 1 INJECTION, SOLUTION INTRAMUSCULAR; INTRAVENOUS; SUBCUTANEOUS at 10:43

## 2019-01-21 RX ADMIN — Medication 50 MG: at 18:04

## 2019-01-21 RX ADMIN — ONDANSETRON 4 MG: 2 INJECTION INTRAMUSCULAR; INTRAVENOUS at 18:34

## 2019-01-21 RX ADMIN — FENTANYL CITRATE 50 MCG: 50 INJECTION, SOLUTION INTRAMUSCULAR; INTRAVENOUS at 19:04

## 2019-01-21 RX ADMIN — SODIUM CHLORIDE 100 ML/HR: 0.9 INJECTION, SOLUTION INTRAVENOUS at 19:33

## 2019-01-21 RX ADMIN — HYDROMORPHONE HYDROCHLORIDE 0.5 MG: 1 INJECTION, SOLUTION INTRAMUSCULAR; INTRAVENOUS; SUBCUTANEOUS at 05:02

## 2019-01-21 RX ADMIN — FENTANYL CITRATE 50 MCG: 50 INJECTION, SOLUTION INTRAMUSCULAR; INTRAVENOUS at 19:09

## 2019-01-21 RX ADMIN — MIDAZOLAM HYDROCHLORIDE 2 MG: 1 INJECTION, SOLUTION INTRAMUSCULAR; INTRAVENOUS at 17:56

## 2019-01-21 RX ADMIN — SODIUM CHLORIDE 100 ML/HR: 0.9 INJECTION, SOLUTION INTRAVENOUS at 12:18

## 2019-01-21 RX ADMIN — OXYCODONE HYDROCHLORIDE 5 MG: 5 TABLET ORAL at 13:27

## 2019-01-21 RX ADMIN — KETOROLAC TROMETHAMINE 30 MG: 30 INJECTION, SOLUTION INTRAMUSCULAR at 20:23

## 2019-01-21 RX ADMIN — FENTANYL CITRATE 50 MCG: 50 INJECTION INTRAMUSCULAR; INTRAVENOUS at 18:07

## 2019-01-21 RX ADMIN — DEXMEDETOMIDINE HYDROCHLORIDE 4 MCG: 100 INJECTION, SOLUTION INTRAVENOUS at 18:13

## 2019-01-21 RX ADMIN — OXYCODONE HYDROCHLORIDE 5 MG: 5 TABLET ORAL at 09:25

## 2019-01-21 RX ADMIN — LORAZEPAM 0.5 MG: 0.5 TABLET ORAL at 15:00

## 2019-01-21 RX ADMIN — VANCOMYCIN HYDROCHLORIDE 1250 MG: 10 INJECTION, POWDER, LYOPHILIZED, FOR SOLUTION INTRAVENOUS at 13:00

## 2019-01-21 RX ADMIN — DEXAMETHASONE SODIUM PHOSPHATE 4 MG: 10 INJECTION INTRAMUSCULAR; INTRAVENOUS at 18:02

## 2019-01-21 RX ADMIN — HYDROMORPHONE HYDROCHLORIDE 0.5 MG: 1 INJECTION, SOLUTION INTRAMUSCULAR; INTRAVENOUS; SUBCUTANEOUS at 14:03

## 2019-01-21 RX ADMIN — PROPOFOL 200 MG: 10 INJECTION, EMULSION INTRAVENOUS at 18:00

## 2019-01-21 RX ADMIN — DEXMEDETOMIDINE HYDROCHLORIDE 4 MCG: 100 INJECTION, SOLUTION INTRAVENOUS at 18:17

## 2019-01-21 NOTE — ASSESSMENT & PLAN NOTE
· Presents 5 days status post puncture wound to left index finger  · X-ray of the left hand showed "There is no acute fracture or dislocation  No significant degenerative changes  No suspicious appearing osseous lesions are seen  Soft tissue swelling of the index digit  There is a 4 mm round metallic foreign body in the volar soft tissues at the level of the distal metacarpal metaphysis  No radiopaque foreign body seen in the index digit"  · He is status post left index finger I&D/washout on 1/18/19  Wound cultures growing MRSA  · Continue IV vancomycin  · Vanco trough with next dose    Dose adjustment per pharmacy protocol  · I&D per Ortho today

## 2019-01-21 NOTE — ANESTHESIA PREPROCEDURE EVALUATION
Review of Systems/Medical History  Patient summary reviewed  Chart reviewed  No history of anesthetic complications     Cardiovascular  EKG reviewed, Exercise tolerance (METS): >4,  Hyperlipidemia, Hypertension ,    Pulmonary  Smoker cigarette smoker  , COPD ,        GI/Hepatic  Negative GI/hepatic ROS          Negative  ROS        Endo/Other  Negative endo/other ROS      GYN       Hematology  Negative hematology ROS      Musculoskeletal    Comment: Left hand injury      Neurology  Negative neurology ROS      Psychology   Psychiatric history, Depression , bipolar disorder,              Physical Exam    Airway    Mallampati score: I  TM Distance: >3 FB  Neck ROM: full     Dental   Comment: Multiple missing no loose, upper dentures and lower dentures,     Cardiovascular  Cardiovascular exam normal    Pulmonary  Pulmonary exam normal     Other Findings      Lab Results   Component Value Date    WBC 9 55 01/21/2019    HGB 13 6 01/21/2019    HCT 41 1 01/21/2019    MCV 92 01/21/2019     01/21/2019     Lab Results   Component Value Date     06/19/2014    K 4 5 01/21/2019    CO2 20 (L) 01/21/2019     01/21/2019    BUN 19 01/21/2019    CREATININE 0 70 01/21/2019     Lab Results   Component Value Date    INR 0 94 01/18/2019    INR 1 09 06/19/2014    PROTIME 12 1 01/18/2019    PROTIME 13 6 06/19/2014     Lab Results   Component Value Date    PTT 28 06/19/2014       Lab Results   Component Value Date    GLUCOSE 93 06/19/2014     Anesthesia Plan  ASA Score- 2     Anesthesia Type- general with ASA Monitors  Additional Monitors:   Airway Plan: LMA  Plan Factors-    Induction- intravenous  Postoperative Plan- Plan for postoperative opioid use  Informed Consent- Anesthetic plan and risks discussed with patient  I personally reviewed this patient with the CRNA  Discussed and agreed on the Anesthesia Plan with the CRNA  Ahsan Montaño

## 2019-01-21 NOTE — ASSESSMENT & PLAN NOTE
· Systolic BP in the 058J to 160s at the time of presentation    Likely in the setting of acute pain  · He denies any medical history however review of his old records do show history of hypertension  · Patient not taking any medications prior to admission  · Blood pressure improved with pain control  · Encouraged outpatient follow-up with a primary care physician for routine care following discharge

## 2019-01-21 NOTE — PLAN OF CARE
DISCHARGE PLANNING     Discharge to home or other facility with appropriate resources Progressing        INFECTION - ADULT     Absence or prevention of progression during hospitalization Progressing        Knowledge Deficit     Patient/family/caregiver demonstrates understanding of disease process, treatment plan, medications, and discharge instructions Progressing        MUSCULOSKELETAL - ADULT     Maintain or return mobility to safest level of function Progressing        PAIN - ADULT     Verbalizes/displays adequate comfort level or baseline comfort level Progressing        Prexisting or High Potential for Compromised Skin Integrity     Skin integrity is maintained or improved Progressing        SAFETY ADULT     Patient will remain free of falls Progressing     Maintain or return to baseline ADL function Progressing     Maintain or return mobility status to optimal level Progressing

## 2019-01-21 NOTE — PROGRESS NOTES
Progress Note - Anthony Riddle 1975, 39 y o  male MRN: [id number]    Unit/Bed#: -01 Encounter: [id number]    Primary Care Provider: No primary care provider on file  Date and time admitted to hospital: 1/18/2019 10:17 AM        * Flexor tenosynovitis of finger   Assessment & Plan    · Presents 5 days status post puncture wound to left index finger  · X-ray of the left hand showed "There is no acute fracture or dislocation  No significant degenerative changes  No suspicious appearing osseous lesions are seen  Soft tissue swelling of the index digit  There is a 4 mm round metallic foreign body in the volar soft tissues at the level of the distal metacarpal metaphysis  No radiopaque foreign body seen in the index digit"  · He is status post left index finger I&D/washout on 1/18/19  Wound cultures growing MRSA  · Continue IV vancomycin  · Vanco trough with next dose  Dose adjustment per pharmacy protocol  · I&D per Ortho today     Elevated blood pressure reading   Assessment & Plan    · Systolic BP in the 278H to 160s at the time of presentation  Likely in the setting of acute pain  · He denies any medical history however review of his old records do show history of hypertension  · Patient not taking any medications prior to admission  · Blood pressure improved with pain control  · Encouraged outpatient follow-up with a primary care physician for routine care following discharge       VTE Pharmacologic Prophylaxis:   Pharmacologic: Low risk ambulate  Mechanical VTE Prophylaxis in Place: Yes    Patient Centered Rounds: I have performed bedside rounds with nursing staff today  Discussions with Specialists or Other Care Team Provider:  Nursing    Education and Discussions with Family / Patient:  Patient    Time Spent for Care: 20 minutes  More than 50% of total time spent on counseling and coordination of care as described above      Current Length of Stay: 3 day(s)    Current Patient Status: Inpatient Certification Statement: The patient will continue to require additional inpatient hospital stay due to IV antibiotics and orthopedic intervention    Discharge Plan:  Pending orthopedic clearance    Code Status: Level 3 - DNAR and DNI      Subjective:   No events reported  Patient anxious to awaiting for I&D    Objective:     Vitals:   Temp (24hrs), Av 2 °F (36 8 °C), Min:98 °F (36 7 °C), Max:98 5 °F (36 9 °C)    Temp:  [98 °F (36 7 °C)-98 5 °F (36 9 °C)] 98 5 °F (36 9 °C)  HR:  [68-98] 98  Resp:  [18] 18  BP: (110-128)/(60-80) 128/80  SpO2:  [95 %-98 %] 96 %  Body mass index is 22 06 kg/m²  Input and Output Summary (last 24 hours): Intake/Output Summary (Last 24 hours) at 19 1224  Last data filed at 19 0900   Gross per 24 hour   Intake           866 67 ml   Output                0 ml   Net           866 67 ml       Physical Exam:     Physical Exam     Gen -Patient comfortable   Neck- Supple  No thyromegaly or lymphadenopathy  Lungs-Clear bilaterally without any wheeze or rales   Heart S1-S2, regular rate and rhythm, no murmurs  Abdomen-soft nontender, no organomegaly  Bowel sounds present  Extremities-no cyanosi,  clubbing or edema  Left hand dressing intact  Neuro-nonfocal       Additional Data:     Labs:      Results from last 7 days  Lab Units 19  0607 19  1137   WBC Thousand/uL 9 55 14 38*   HEMOGLOBIN g/dL 13 6 13 9   HEMATOCRIT % 41 1 42 0   PLATELETS Thousands/uL 319 355   NEUTROS PCT %  --  75   LYMPHS PCT %  --  17   MONOS PCT %  --  7   EOS PCT %  --  1       Results from last 7 days  Lab Units 19  0607   SODIUM mmol/L 136   POTASSIUM mmol/L 4 5   CHLORIDE mmol/L 107   CO2 mmol/L 20*   BUN mg/dL 19   CREATININE mg/dL 0 70   ANION GAP mmol/L 9   CALCIUM mg/dL 8 7   GLUCOSE RANDOM mg/dL 98       Results from last 7 days  Lab Units 19  0509   INR  0 94                       * I Have Reviewed All Lab Data Listed Above    * Additional Pertinent Lab Tests Reviewed: All Labs Within Last 24 Hours Reviewed    Imaging:    Imaging Reports Reviewed Today Include:   Imaging Personally Reviewed by Myself Includes:      Recent Cultures (last 7 days):       Results from last 7 days  Lab Units 01/18/19  7048   GRAM STAIN RESULT  Rare Polys  Rare Gram positive cocci in clusters   WOUND CULTURE  3+ Growth of Methicillin Resistant Staphylococcus aureus*       Last 24 Hours Medication List:     Current Facility-Administered Medications:  acetaminophen 650 mg Oral Q6H PRN Reba Nicholas, MD    HYDROmorphone 0 5 mg Intravenous Q3H PRN Reba Nicholas, MD    ketorolac 30 mg Intravenous Q6H PRN Maggie Bocanegra MD    naloxone 0 04 mg Intravenous Q1MIN PRN Reba Nicholas, MD    ondansetron 4 mg Intravenous Q8H PRN Reba Nicholas MD    oxyCODONE 5 mg Oral Q4H PRN Reba Drivers, MD    senna-docusate sodium 2 tablet Oral Daily Reba Nicholas MD    sodium chloride 100 mL/hr Intravenous Continuous Rupali Jeffries CRNA Last Rate: 100 mL/hr (01/21/19 1218)   vancomycin 15 mg/kg Intravenous Durga Lin MD Last Rate: 1,250 mg (01/21/19 0430)        Today, Patient Was Seen By: Warren Moon MD    ** Please Note: Dictation voice to text software may have been used in the creation of this document   **

## 2019-01-21 NOTE — OP NOTE
OPERATIVE REPORT  PATIENT NAME: Jaime James  :  1975  MRN: [id number]  Pt Location: [facility]    SURGERY DATE: 19    Surgeon(s) and Role:     * Robyn Luna MD - Primary    Pre-Op Diagnosis:  Abscess of finger of left hand [L02 512]    Post-Op Diagnosis Codes:     * Abscess of finger of left hand [L02 512]    Procedure(s):  DEBRIDEMENT HAND/FINGER ([address] OUT) (Left)    Specimen(s):  * No orders in the log *    Estimated Blood Loss:   Minimal    Anesthesia Type:   Conscious Sedation     IMPLANTS:  * No implants in log *    PERIOPERATIVE ANTIBIOTICS:    vancomycin, 1 gram    Tourniquet Time: 24 at 250  mmHg          Operative Indications: The patient has a history of left index finger infection previously washed out in the operating room that demonstrate persistent pain swelling and growth of MR stay in culture medium  The decision was made to bring the patient to the operating room for repeat incision irrigation debridement Risks of the procedure were explained which include, but are not limited to bleeding; infection; damage to nerves, arteries,veins, tendons; scar; pain; need for reoperation; failure to give desired result; and risks of anaesthesia  All questions were answered to satisfaction and they were willing to proceed  Operative Findings:  Purulent fluid collection superficial to the extensor mechanism  No involvement of the volar structures    Complications:   None    Procedure and Technique:  After the patient, site, and procedure were identified, the patient was brought into the operating room in a supine position  General anaesthesia and local medication were provided  A well padded tourniquet was applied to the extremity, set at 250 mmHg  The  left upper extremity was then prepped and drapped in a normal, sterile, orthopedic fashion  The radial mid axial incision was extended proximally and distally to span the PIP and DIP joint creases    Dissection was taken down with a scissor being careful to protect the radial neurovascular bundle which  Was volar to the incision upon exploration there was significant purulent fluid collection which appeared to be superficial to the extensor mechanism  Using blunt dissection this was explored and found to extend to the ulnar side of the digit but not crossing to the volar aspect  Next the skin edges as well as the fascia underneath were sharply excised due to the maceration and necrotic tissue  Some fat necrosis was noted and was debrided sharply  Next a curette was used to abrade the subcutaneous and deep dermal tissue and removed any remaining a compromised tissue  Next a Ray-Oh was utilized to remove any additional remaining compromised tissue including fat skin soft tissue fascia but not bone or muscle  1 L of irrigation was utilized to irrigate the wound  The skin edges were again debrided a significant dermal loss was noted at the sinus site  The wound was packed with iodoform packing dorsal to the mid axial line  At the completion of the procedure, hemostasis was obtained with cautery and direct pressure  The wounds were closed with Nylon  Sterile dressings were applied, including Xeroform, Gauze and Finger Dressing  Please note, all sponge, needle, and instrument counts were correct prior to closure  Loupe magnification was utilized  The patient tolerated the procedure well       I was present for the entire procedure    Patient Disposition:  PACU     SIGNATURE: Freda Taylor MD  DATE: 01/21/19  TIME: 6:38 PM

## 2019-01-21 NOTE — PROGRESS NOTES
Orthopedics   Lucero Peter 39 y o  male MRN: [id number]  Unit/Bed#: -01      Subjective:  Patient seen and evaluated  Notes persistent pain and swelling of the left index finger  No numbness or tingling  No fevers or chills      Labs:    0  Lab Value Date/Time   HCT 41 1 01/21/2019 0607   HCT 42 0 01/19/2019 1137   HCT 45 0 01/18/2019 0509   HCT 50 7 (H) 06/19/2014 0950   HGB 13 6 01/21/2019 0607   HGB 13 9 01/19/2019 1137   HGB 14 8 01/18/2019 0509   HGB 18 3 (H) 06/19/2014 0950   INR 0 94 01/18/2019 0509   INR 1 09 06/19/2014 0950   WBC 9 55 01/21/2019 0607   WBC 14 38 (H) 01/19/2019 1137   WBC 13 15 (H) 01/18/2019 0509   WBC 14 27 (H) 06/19/2014 0950       Meds:    Current Facility-Administered Medications:     acetaminophen (TYLENOL) tablet 650 mg, 650 mg, Oral, Q6H PRN, Remington Betancourt MD    HYDROmorphone (DILAUDID) injection 0 5 mg, 0 5 mg, Intravenous, Q3H PRN, Remington Betancourt MD, 0 5 mg at 01/21/19 0502    ketorolac (TORADOL) injection 30 mg, 30 mg, Intravenous, Q6H PRN, Maggie Bocanegra MD, 30 mg at 01/21/19 0434    naloxone (NARCAN) 0 04 mg/mL syringe 0 04 mg, 0 04 mg, Intravenous, Q1MIN PRN, Remington Betancourt MD    ondansetron (ZOFRAN) injection 4 mg, 4 mg, Intravenous, Q8H PRN, Remington Betancourt MD    oxyCODONE (ROXICODONE) IR tablet 5 mg, 5 mg, Oral, Q4H PRN, Remington Betancourt MD, 5 mg at 01/20/19 2012    senna-docusate sodium (SENOKOT S) 8 6-50 mg per tablet 2 tablet, 2 tablet, Oral, Daily, Remington Betancourt MD, 2 tablet at 01/19/19 0803    sodium chloride 0 9 % infusion, 100 mL/hr, Intravenous, Continuous, Duke Blake CRNA, Last Rate: 100 mL/hr at 01/20/19 1221, 100 mL/hr at 01/20/19 1221    vancomycin (VANCOCIN) 1,250 mg in sodium chloride 0 9 % 250 mL IVPB, 15 mg/kg, Intravenous, Q8H, Remington Betancourt MD, Last Rate: 166 7 mL/hr at 01/21/19 0430, 1,250 mg at 01/21/19 0430    Blood Culture:   No results found for: BLOODCX    Wound Culture:   Lab Results   Component Value Date    WOUNDCULT (A) 01/18/2019     3+ Growth of Methicillin Resistant Staphylococcus aureus         Physical exam:  Vitals:    01/20/19 2255   BP: 127/68   Pulse: 68   Resp: 18   Temp: 98 2 °F (36 8 °C)   SpO2: 95%      Left index finger:  · Diffuse swelling noted about the digit  · Scant purulent drainage noted about the surgical wound  · Diffuse tenderness to palpation about the digit  · Digital range of motion is intact in significantly limited  · Sensation intact distally  · Brisk capillary refill    _*_*_*_*_*_*_*_*_*_*_*_*_*_*_*_*_*_*_*_*_*_*_*_*_*_*_*_*_*_*_*_*_*_*_*_*_*_*_*_*_*    Assessment: 43 y o male status post left index finger irrigation and debridement for flexor tenosynovitis    Plan:  Plan for OR today for additional I and D washout  NPO  Pain control  Continue antibiotics      Cheryl Tanner PA-C

## 2019-01-21 NOTE — ANESTHESIA POSTPROCEDURE EVALUATION
Post-Op Assessment Note      CV Status:  Stable    Mental Status:  Alert and awake    Hydration Status:  Euvolemic    PONV Controlled:  Controlled    Airway Patency:  Patent and adequate    Post Op Vitals Reviewed: Yes          Staff: CRNA           BP (P) 127/86 (01/21/19 1842)    Temp (P) 98 5 °F (36 9 °C) (01/21/19 1842)    Pulse (P) 72 (01/21/19 1842)   Resp (P) 18 (01/21/19 1842)    SpO2 (P) 98 % (01/21/19 1842)

## 2019-01-22 PROCEDURE — 99024 POSTOP FOLLOW-UP VISIT: CPT | Performed by: PHYSICIAN ASSISTANT

## 2019-01-22 PROCEDURE — 99232 SBSQ HOSP IP/OBS MODERATE 35: CPT | Performed by: INTERNAL MEDICINE

## 2019-01-22 RX ADMIN — VANCOMYCIN HYDROCHLORIDE 1250 MG: 10 INJECTION, POWDER, LYOPHILIZED, FOR SOLUTION INTRAVENOUS at 04:02

## 2019-01-22 RX ADMIN — HYDROMORPHONE HYDROCHLORIDE 0.5 MG: 1 INJECTION, SOLUTION INTRAMUSCULAR; INTRAVENOUS; SUBCUTANEOUS at 21:01

## 2019-01-22 RX ADMIN — KETOROLAC TROMETHAMINE 30 MG: 30 INJECTION, SOLUTION INTRAMUSCULAR at 06:59

## 2019-01-22 RX ADMIN — VANCOMYCIN HYDROCHLORIDE 1250 MG: 10 INJECTION, POWDER, LYOPHILIZED, FOR SOLUTION INTRAVENOUS at 12:19

## 2019-01-22 RX ADMIN — VANCOMYCIN HYDROCHLORIDE 1250 MG: 10 INJECTION, POWDER, LYOPHILIZED, FOR SOLUTION INTRAVENOUS at 20:12

## 2019-01-22 RX ADMIN — SODIUM CHLORIDE 100 ML/HR: 0.9 INJECTION, SOLUTION INTRAVENOUS at 16:14

## 2019-01-22 RX ADMIN — SENNOSIDES AND DOCUSATE SODIUM 2 TABLET: 8.6; 5 TABLET ORAL at 08:27

## 2019-01-22 RX ADMIN — OXYCODONE HYDROCHLORIDE 5 MG: 5 TABLET ORAL at 20:08

## 2019-01-22 RX ADMIN — OXYCODONE HYDROCHLORIDE 5 MG: 5 TABLET ORAL at 08:27

## 2019-01-22 RX ADMIN — LORAZEPAM 0.5 MG: 0.5 TABLET ORAL at 17:55

## 2019-01-22 RX ADMIN — HYDROMORPHONE HYDROCHLORIDE 0.5 MG: 1 INJECTION, SOLUTION INTRAMUSCULAR; INTRAVENOUS; SUBCUTANEOUS at 16:10

## 2019-01-22 RX ADMIN — HYDROMORPHONE HYDROCHLORIDE 0.5 MG: 1 INJECTION, SOLUTION INTRAMUSCULAR; INTRAVENOUS; SUBCUTANEOUS at 11:51

## 2019-01-22 RX ADMIN — OXYCODONE HYDROCHLORIDE 5 MG: 5 TABLET ORAL at 12:36

## 2019-01-22 NOTE — PLAN OF CARE
Problem: DISCHARGE PLANNING - CARE MANAGEMENT  Goal: Discharge to post-acute care or home with appropriate resources  INTERVENTIONS:  - Conduct assessment to determine patient/family and health care team treatment goals, and need for post-acute services based on payer coverage, community resources, and patient preferences, and barriers to discharge  - Address psychosocial, clinical, and financial barriers to discharge as identified in assessment in conjunction with the patient/family and health care team  - Arrange appropriate level of post-acute services according to patients   needs and preference and payer coverage in collaboration with the physician and health care team  - Communicate with and update the patient/family, physician, and health care team regarding progress on the discharge plan  - Arrange appropriate transportation to post-acute venues  Outcome: Progressing  LOS 4 DAYS  PATIENT IS NOT A BUNDLE  PATIENT IS NOT A READMISSION  Patient reported to  that he currently lives in [address] [place] [address] area with his daughter  Patient reported to  that he does not work currently but has started [facility]  Patient denies Hx of VNA, Rehab, and reported outpatient PT in the past  Patient reported that he is in the middle of a divorce  Patient reported that his daughter is HCR  Patient has license, no car, and plans to obtain car after he receives his tax return  Patient reported that he was transferred down from [facility] to [place] and will most likely require a ride home from [place]  Patient reported that he does not have family to transport him home   CM will follow through discharge       CM reviewed d/c planning process including the following: identifying help at home, patient preference for d/c planning needs, Discharge Lounge, [facility] Meds to Bed program, availability of treatment team to discuss questions or concerns patient and/or family may have regarding understanding medications and recognizing signs and symptoms once discharged  CM also encouraged patient to follow up with all recommended appointments after discharge  Patient advised of importance for patient and family to participate in managing patients medical well being

## 2019-01-22 NOTE — ASSESSMENT & PLAN NOTE
· Systolic BP in the 086F to 160s at the time of presentation    Likely in the setting of acute pain  · He denies any medical history however review of his old records do show history of hypertension  · Patient not taking any medications prior to admission  · Blood pressure improved with pain control  · Encouraged outpatient follow-up with a primary care physician for routine care following discharge

## 2019-01-22 NOTE — SOCIAL WORK
LOS 4 DAYS  PATIENT IS NOT A BUNDLE  PATIENT IS NOT A READMISSION  Patient reported to  that he currently lives in [address] [place] [address] area with his daughter  Patient reported to  that he does not work currently but has started [facility]  Patient denies Hx of VNA, Rehab, and reported outpatient PT in the past  Patient reported that he is in the middle of a divorce  Patient reported that his daughter is HCR  Patient has license, no car, and plans to obtain car after he receives his tax return  Patient reported that he was transferred down from [facility] to [facility] and will most likely require a ride home from [facility]  Patient reported that he does not have family to transport him home  CM will follow through discharge  CM reviewed d/c planning process including the following: identifying help at home, patient preference for d/c planning needs, Discharge Lounge, [facility] Meds to Bed program, availability of treatment team to discuss questions or concerns patient and/or family may have regarding understanding medications and recognizing signs and symptoms once discharged  CM also encouraged patient to follow up with all recommended appointments after discharge  Patient advised of importance for patient and family to participate in managing patients medical well being

## 2019-01-22 NOTE — PROGRESS NOTES
Orthopedics   Luigi Jarrett 39 y o  male MRN: [id number]  Unit/Bed#: -01      Subjective:  43 y o male status post I&D of left index finger for MRSA flexor tenosynovitis on 01/18/2019 01/21/2019  He does note throbbing pain in the finger still but feels that the swelling has improved as well as pain has slightly improved  Denies any numbness or tingling in the finger  Denies any fevers or chills      Labs:    0  Lab Value Date/Time   HCT 41 1 01/21/2019 0607   HCT 42 0 01/19/2019 1137   HCT 45 0 01/18/2019 0509   HCT 50 7 (H) 06/19/2014 0950   HGB 13 6 01/21/2019 0607   HGB 13 9 01/19/2019 1137   HGB 14 8 01/18/2019 0509   HGB 18 3 (H) 06/19/2014 0950   INR 0 94 01/18/2019 0509   INR 1 09 06/19/2014 0950   WBC 9 55 01/21/2019 0607   WBC 14 38 (H) 01/19/2019 1137   WBC 13 15 (H) 01/18/2019 0509   WBC 14 27 (H) 06/19/2014 0950       Meds:    Current Facility-Administered Medications:     acetaminophen (TYLENOL) tablet 650 mg, 650 mg, Oral, Q6H PRN, Donn Hooks MD    HYDROmorphone (DILAUDID) injection 0 5 mg, 0 5 mg, Intravenous, Q3H PRN, Donn Hooks MD, 0 5 mg at 01/22/19 1151    ketorolac (TORADOL) injection 30 mg, 30 mg, Intravenous, Q6H PRN, Maggie Bocanegra MD, 30 mg at 01/22/19 0659    LORazepam (ATIVAN) tablet 0 5 mg, 0 5 mg, Oral, Q6H PRN, Maggie Bocanegra MD, 0 5 mg at 01/21/19 1500    naloxone (NARCAN) 0 04 mg/mL syringe 0 04 mg, 0 04 mg, Intravenous, Q1MIN PRN, Donn Hooks MD    ondansetron (ZOFRAN) injection 4 mg, 4 mg, Intravenous, Q8H PRN, Donn Hooks MD    oxyCODONE (ROXICODONE) IR tablet 5 mg, 5 mg, Oral, Q4H PRN, Donn Hooks MD, 5 mg at 01/22/19 0827    senna-docusate sodium (SENOKOT S) 8 6-50 mg per tablet 2 tablet, 2 tablet, Oral, Daily, Donn Hooks MD, 2 tablet at 01/22/19 0827    sodium chloride 0 9 % infusion, 100 mL/hr, Intravenous, Continuous, Elsy Briseno CRNA, Last Rate: 100 mL/hr at 01/21/19 1933, 100 mL/hr at 01/21/19 1933    vancomycin (VANCOCIN) 1,250 mg in sodium chloride 0 9 % 250 mL IVPB, 15 mg/kg, Intravenous, Q8H, Lida Hinds MD, Last Rate: 166 7 mL/hr at 01/22/19 1219, 1,250 mg at 01/22/19 1219    Blood Culture:   No results found for: BLOODCX    Wound Culture:   Lab Results   Component Value Date    WOUNDCULT (A) 01/18/2019     3+ Growth of Methicillin Resistant Staphylococcus aureus       Ins and Outs:  I/O last 24 hours: In: 1140 [P O :240; I V :900]  Out: -           Physical Exam:  Vitals:    01/22/19 0500   BP: 120/75   Pulse: 70   Resp: 18   Temp: 98 5 °F (36 9 °C)   SpO2: 96%     left upper extremity - index finger  · Dressings clean dry and intact there is no purulent drainage from surgical wound on dorsal radial aspect of index finger, packing pulled with no purulence, did re-pack the wound    Sutures and incision intact  · Tenderness to palpation diffusely around the index finger, capillary refill is brisk throughout the fingers sensation is intact to light touch, active flexion and extension is intact  · Sensation intact with Axillary, Musculocutaneous, Radial, Ulna, Median nerves  · Motor intact to Axillary, Musculocutaneous, Radial, Ulna, Median nerves  · 2+ radial pulse    _*_*_*_*_*_*_*_*_*_*_*_*_*_*_*_*_*_*_*_*_*_*_*_*_*_*_*_*_*_*_*_*_*_*_*_*_*_*_*_*_*    Assessment: 43 y o male status post left index finger I and D for MRSA flexor tenosynovitis on 01/18/2019 and 01/21/2019    Plan:   · Up and out of bed  · PT/OT - try to maintain active flexion and extension of the fingers  · DVT prophylaxis per primary  · Analgesics per primary, consider changing toradol from IV to IM  · IV antibiotics - continue Vanco  · Re-initiated betadine soaks TID as specified in nursing order  · No purulence from wound today or on packing, will consider removing rest of packing tomorrow and follow closely at this time not likely he will need additional I and D        Rosette Aquino PA-C

## 2019-01-22 NOTE — PLAN OF CARE
DISCHARGE PLANNING     Discharge to home or other facility with appropriate resources Progressing        INFECTION - ADULT     Absence or prevention of progression during hospitalization Progressing        Knowledge Deficit     Patient/family/caregiver demonstrates understanding of disease process, treatment plan, medications, and discharge instructions Progressing        MUSCULOSKELETAL - ADULT     Maintain or return mobility to safest level of function Progressing        PAIN - ADULT     Verbalizes/displays adequate comfort level or baseline comfort level Progressing        Prexisting or High Potential for Compromised Skin Integrity     Skin integrity is maintained or improved Progressing        SAFETY ADULT     Patient will remain free of falls Progressing     Maintain or return to baseline ADL function Progressing     Maintain or return mobility status to optimal level [address] Discharge to home or other facility with appropriate resources Progressing          INFECTION - ADULT     Absence or prevention of progression during hospitalization Progressing          Knowledge Deficit     Patient/family/caregiver demonstrates understanding of disease process, treatment plan, medications, and discharge instructions Progressing          MUSCULOSKELETAL - ADULT     Maintain or return mobility to safest level of function Progressing          PAIN - ADULT     Verbalizes/displays adequate comfort level or baseline comfort level Progressing          Prexisting or High Potential for Compromised Skin Integrity     Skin integrity is maintained or improved Progressing          SAFETY ADULT     Patient will remain free of falls Progressing     Maintain or return to baseline ADL function Progressing     Maintain or return mobility status to optimal level Progressing

## 2019-01-22 NOTE — PROGRESS NOTES
Progress Note - Laila Roy 1975, 39 y o  male MRN: [id number]    Unit/Bed#: -01 Encounter: [id number]    Primary Care Provider: No primary care provider on file  Date and time admitted to hospital: 1/18/2019 10:17 AM        * Flexor tenosynovitis of finger   Assessment & Plan    · Presents 5 days status post puncture wound to left index finger  · X-ray of the left hand showed "There is no acute fracture or dislocation  No significant degenerative changes  No suspicious appearing osseous lesions are seen  Soft tissue swelling of the index digit  There is a 4 mm round metallic foreign body in the volar soft tissues at the level of the distal metacarpal metaphysis  No radiopaque foreign body seen in the index digit"  · He is status post left index finger I&D/washout on 1/18/19  Wound cultures growing MRSA  · Continue IV vancomycin  · Vanco trough with next dose  Dose adjustment per pharmacy protocol  · I&D per Ortho today     Elevated blood pressure reading   Assessment & Plan    · Systolic BP in the 572I to 160s at the time of presentation  Likely in the setting of acute pain  · He denies any medical history however review of his old records do show history of hypertension  · Patient not taking any medications prior to admission  · Blood pressure improved with pain control  · Encouraged outpatient follow-up with a primary care physician for routine care following discharge       VTE Pharmacologic Prophylaxis:   Pharmacologic: Low risk  Mechanical VTE Prophylaxis in Place: Yes    Patient Centered Rounds: I have performed bedside rounds with nursing staff today  Discussions with Specialists or Other Care Team Provider:  Nursing    Education and Discussions with Family / Patient:  Patient    Time Spent for Care: 20 minutes  More than 50% of total time spent on counseling and coordination of care as described above      Current Length of Stay: 4 day(s)    Current Patient Status: Inpatient Certification Statement: The patient will continue to require additional inpatient hospital stay due to IV antibiotics    Discharge Plan:  Next 24 hr    Code Status: Level 3 - DNAR and DNI      Subjective:   Feeling okay today  Pain is somewhat controlled    Objective:     Vitals:   Temp (24hrs), Av 1 °F (36 7 °C), Min:97 5 °F (36 4 °C), Max:98 7 °F (37 1 °C)    Temp:  [97 5 °F (36 4 °C)-98 7 °F (37 1 °C)] 98 5 °F (36 9 °C)  HR:  [54-86] 76  Resp:  [12-18] 18  BP: (110-132)/(68-88) 130/70  SpO2:  [95 %-98 %] 97 %  Body mass index is 22 06 kg/m²  Input and Output Summary (last 24 hours): Intake/Output Summary (Last 24 hours) at 19 1333  Last data filed at 19 0900   Gross per 24 hour   Intake             1140 ml   Output                0 ml   Net             1140 ml       Physical Exam:     Physical Exam     Gen -Patient comfortable at rest  Neck- Supple  No thyromegaly or lymphadenopathy  Lungs-Clear bilaterally without any wheeze or rales   Heart S1-S2, regular rate and rhythm, no murmurs  Abdomen-soft nontender, no organomegaly  Bowel sounds present  Extremities-no cyanosi,  clubbing or edema  Left index finger dressing intact  Skin- no rash  Neuro-nonfocal       Additional Data:     Labs:      Results from last 7 days  Lab Units 19  0607 19  1137   WBC Thousand/uL 9 55 14 38*   HEMOGLOBIN g/dL 13 6 13 9   HEMATOCRIT % 41 1 42 0   PLATELETS Thousands/uL 319 355   NEUTROS PCT %  --  75   LYMPHS PCT %  --  17   MONOS PCT %  --  7   EOS PCT %  --  1       Results from last 7 days  Lab Units 19  0607   SODIUM mmol/L 136   POTASSIUM mmol/L 4 5   CHLORIDE mmol/L 107   CO2 mmol/L 20*   BUN mg/dL 19   CREATININE mg/dL 0 70   ANION GAP mmol/L 9   CALCIUM mg/dL 8 7   GLUCOSE RANDOM mg/dL 98       Results from last 7 days  Lab Units 19  0509   INR  0 94                       * I Have Reviewed All Lab Data Listed Above  * Additional Pertinent Lab Tests Reviewed:  All Labs Within Last 24 Hours Reviewed    Imaging:    Imaging Reports Reviewed Today Include:   Imaging Personally Reviewed by Myself Includes:      Recent Cultures (last 7 days):       Results from last 7 days  Lab Units 01/18/19  1738   GRAM STAIN RESULT  Rare Polys  Rare Gram positive cocci in clusters   WOUND CULTURE  3+ Growth of Methicillin Resistant Staphylococcus aureus*       Last 24 Hours Medication List:     Current Facility-Administered Medications:  acetaminophen 650 mg Oral Q6H PRN Donn Hooks MD    HYDROmorphone 0 5 mg Intravenous Q3H PRN Donn Hooks MD    ketorolac 30 mg Intravenous Q6H PRN Maggie Bocanegra MD    LORazepam 0 5 mg Oral Q6H PRN Maggie Bocanegra MD    naloxone 0 04 mg Intravenous Q1MIN PRN Donn Hooks MD    ondansetron 4 mg Intravenous Q8H PRN Donn Hooks MD    oxyCODONE 5 mg Oral Q4H PRN Donn Hooks MD    senna-docusate sodium 2 tablet Oral Daily Donn Hooks MD    sodium chloride 100 mL/hr Intravenous Continuous Elsy Briseno CRNA Last Rate: 100 mL/hr (01/21/19 1933)   vancomycin 15 mg/kg Intravenous Q8H Donn Hooks MD Last Rate: 1,250 mg (01/22/19 1219)        Today, Patient Was Seen By: Gregorio Morelos MD    ** Please Note: Dictation voice to text software may have been used in the creation of this document   **

## 2019-01-23 VITALS
SYSTOLIC BLOOD PRESSURE: 130 MMHG | HEART RATE: 88 BPM | TEMPERATURE: 98.3 F | BODY MASS INDEX: 22.06 KG/M2 | DIASTOLIC BLOOD PRESSURE: 65 MMHG | OXYGEN SATURATION: 95 % | RESPIRATION RATE: 18 BRPM | WEIGHT: 190.92 LBS

## 2019-01-23 PROCEDURE — 99024 POSTOP FOLLOW-UP VISIT: CPT | Performed by: SURGERY

## 2019-01-23 PROCEDURE — 99239 HOSP IP/OBS DSCHRG MGMT >30: CPT | Performed by: INTERNAL MEDICINE

## 2019-01-23 RX ORDER — AMOXICILLIN 250 MG
2 CAPSULE ORAL DAILY
Qty: 30 TABLET | Refills: 0 | Status: SHIPPED | OUTPATIENT
Start: 2019-01-24

## 2019-01-23 RX ORDER — OXYCODONE HYDROCHLORIDE 5 MG/1
10 TABLET ORAL EVERY 4 HOURS PRN
Qty: 30 TABLET | Refills: 0 | Status: SHIPPED | OUTPATIENT
Start: 2019-01-23 | End: 2019-02-02

## 2019-01-23 RX ORDER — ACETAMINOPHEN 650 MG
1 TABLET, EXTENDED RELEASE ORAL AS NEEDED
Qty: 480 ML | Refills: 0 | Status: SHIPPED | OUTPATIENT
Start: 2019-01-23

## 2019-01-23 RX ORDER — SULFAMETHOXAZOLE AND TRIMETHOPRIM 800; 160 MG/1; MG/1
1 TABLET ORAL EVERY 12 HOURS SCHEDULED
Qty: 20 TABLET | Refills: 0 | Status: SHIPPED | OUTPATIENT
Start: 2019-01-23 | End: 2019-02-02

## 2019-01-23 RX ADMIN — HYDROMORPHONE HYDROCHLORIDE 0.5 MG: 1 INJECTION, SOLUTION INTRAMUSCULAR; INTRAVENOUS; SUBCUTANEOUS at 07:50

## 2019-01-23 RX ADMIN — OXYCODONE HYDROCHLORIDE 5 MG: 5 TABLET ORAL at 09:20

## 2019-01-23 RX ADMIN — HYDROMORPHONE HYDROCHLORIDE 0.5 MG: 1 INJECTION, SOLUTION INTRAMUSCULAR; INTRAVENOUS; SUBCUTANEOUS at 00:09

## 2019-01-23 RX ADMIN — VANCOMYCIN HYDROCHLORIDE 1250 MG: 10 INJECTION, POWDER, LYOPHILIZED, FOR SOLUTION INTRAVENOUS at 04:00

## 2019-01-23 RX ADMIN — HYDROMORPHONE HYDROCHLORIDE 0.5 MG: 1 INJECTION, SOLUTION INTRAMUSCULAR; INTRAVENOUS; SUBCUTANEOUS at 12:14

## 2019-01-23 RX ADMIN — SODIUM CHLORIDE 100 ML/HR: 0.9 INJECTION, SOLUTION INTRAVENOUS at 04:00

## 2019-01-23 RX ADMIN — VANCOMYCIN HYDROCHLORIDE 1250 MG: 10 INJECTION, POWDER, LYOPHILIZED, FOR SOLUTION INTRAVENOUS at 12:13

## 2019-01-23 NOTE — SOCIAL WORK
LOS 5 DAYS  CM called for Lyft transport from [address] [place] [address] to patient home due to being transfer to this hospital  KEON spoke with Amy Martinez at [facility] and obtained at 2:45pm Waiver signed and placed in medical records

## 2019-01-23 NOTE — PLAN OF CARE
DISCHARGE PLANNING     Discharge to home or other facility with appropriate resources Progressing        DISCHARGE PLANNING - CARE MANAGEMENT     Discharge to post-acute care or home with appropriate resources Progressing        INFECTION - ADULT     Absence or prevention of progression during hospitalization Progressing        Knowledge Deficit     Patient/family/caregiver demonstrates understanding of disease process, treatment plan, medications, and discharge instructions Progressing        MUSCULOSKELETAL - ADULT     Maintain or return mobility to safest level of function Progressing        PAIN - ADULT     Verbalizes/displays adequate comfort level or baseline comfort level Progressing        Prexisting or High Potential for Compromised Skin Integrity     Skin integrity is maintained or improved Progressing        SAFETY ADULT     Patient will remain free of falls Progressing     Maintain or return to baseline ADL function Progressing     Maintain or return mobility status to optimal level Progressing

## 2019-01-23 NOTE — DISCHARGE SUMMARY
Discharge Summary - [facility]    Patient Information: Jose Peter 39 y o  male MRN: [id number]  Unit/Bed#: -01 Encounter: [id number]    Discharging Physician / Practitioner: Caitlin Grey MD  PCP: No primary care provider on file  Admission Date: 1/18/2019  Discharge Date: 01/23/19    Disposition:     Home    Reason for Admission:  Left hand puncture wound    Discharge Diagnoses:     Principal Problem:    Flexor tenosynovitis of finger  Active Problems:    Elevated blood pressure reading  Resolved Problems:    * No resolved hospital problems  *      Consultations During Hospital Stay:  · Orthopedics    Procedures Performed:     · Incision and drainage of left index finger abscess and flexor synovitis      Hospital Course:     Jose Peter is a 39 y o  male patient who originally presented to the hospital on 1/18/2019 due to left hand pain swelling and puncture wound  Patient had insult about 5 days prior to the presentation in the emergency department  Patient upon evaluation noted to have infected left index finger with abscess formation  He was suspected to have  synovitis  Patient was seen by orthopedic team   And had incision and drainage of the abscess and infection  His symptoms gradually improved post procedure  His cultures grew MRSA  He was treated with IV vancomycin  MRSA sensitive to Bactrim; as per orthopedic recommendation patient is being discharged with 10 day course  And  will be evaluated next week in the office by orthopedic team     Condition at Discharge: good     Discharge Day Visit / Exam:     Subjective:  Feeling better today    Wants to go home  Vitals: Blood Pressure: 130/65 (01/23/19 0820)  Pulse: 88 (01/23/19 0820)  Temperature: 98 3 °F (36 8 °C) (01/23/19 0820)  Temp Source: Oral (01/23/19 0820)  Respirations: 18 (01/23/19 0820)  Weight - Scale: 86 6 kg (190 lb 14 7 oz) (01/18/19 1137)  SpO2: 95 % (01/23/19 0820)  Exam:   Physical Exam Gen -Patient comfortable   Neck- Supple  No thyromegaly or lymphadenopathy  Lungs-Clear bilaterally without any wheeze or rales   Heart S1-S2, regular rate and rhythm, no murmurs  Abdomen-soft nontender, no organomegaly  Bowel sounds present  Extremities-no cyanosi,  clubbing or edema  Left index finger wound/no pus or drainage  Skin- no rash  Neuro-nonfocal       Discussion with Family:  Patient    Discharge instructions/Information to patient and family:   See after visit summary for information provided to patient and family  Provisions for Follow-Up Care:  See after visit summary for information related to follow-up care and any pertinent home health orders  Planned Readmission:  No     Discharge Statement:  I spent 35 minutes discharging the patient  This time was spent on the day of discharge  I had direct contact with the patient on the day of discharge  Greater than 50% of the total time was spent examining patient, answering all patient questions, arranging and discussing plan of care with patient as well as directly providing post-discharge instructions  Additional time then spent on discharge activities  Discharge Medications:  See after visit summary for reconciled discharge medications provided to patient and family        ** Please Note: This note has been constructed using a voice recognition system ** Not applicable

## 2019-01-23 NOTE — PROGRESS NOTES
Orthopedics   Mirian Sung 39 y o  male MRN: [id number]  Unit/Bed#: -01      Subjective:  43 y o male status post I&D of left index finger for flexor tenosynovitis on 01/18/2019 and 01/21/2019  Patient reports slight decrease in pain and swelling the finger, does still have throbbing pain in the finger with some swelling  Diminished sensation which is not changed in the past several days but still does have sensation in the finger    Denies any fevers or chills    Labs:    0  Lab Value Date/Time   HCT 41 1 01/21/2019 0607   HCT 42 0 01/19/2019 1137   HCT 45 0 01/18/2019 0509   HCT 50 7 (H) 06/19/2014 0950   HGB 13 6 01/21/2019 0607   HGB 13 9 01/19/2019 1137   HGB 14 8 01/18/2019 0509   HGB 18 3 (H) 06/19/2014 0950   INR 0 94 01/18/2019 0509   INR 1 09 06/19/2014 0950   WBC 9 55 01/21/2019 0607   WBC 14 38 (H) 01/19/2019 1137   WBC 13 15 (H) 01/18/2019 0509   WBC 14 27 (H) 06/19/2014 0950       Meds:    Current Facility-Administered Medications:     acetaminophen (TYLENOL) tablet 650 mg, 650 mg, Oral, Q6H PRN, Jana Genao MD    HYDROmorphone (DILAUDID) injection 0 5 mg, 0 5 mg, Intravenous, Q3H PRN, Jana Genao MD, 0 5 mg at 01/23/19 0009    LORazepam (ATIVAN) tablet 0 5 mg, 0 5 mg, Oral, Q6H PRN, Maggie Bocanegra MD, 0 5 mg at 01/22/19 1755    naloxone (NARCAN) 0 04 mg/mL syringe 0 04 mg, 0 04 mg, Intravenous, Q1MIN PRN, Jana Genao MD    ondansetron (ZOFRAN) injection 4 mg, 4 mg, Intravenous, Q8H PRN, Jana Genao MD    oxyCODONE (ROXICODONE) IR tablet 5 mg, 5 mg, Oral, Q4H PRN, Jana Genao MD, 5 mg at 01/22/19 2008    senna-docusate sodium (SENOKOT S) 8 6-50 mg per tablet 2 tablet, 2 tablet, Oral, Daily, Jana Genao MD, 2 tablet at 01/22/19 0827    sodium chloride 0 9 % infusion, 100 mL/hr, Intravenous, Continuous, Leatha Hudson CRNA, Last Rate: 100 mL/hr at 01/23/19 0400, 100 mL/hr at 01/23/19 0400    vancomycin (VANCOCIN) 1,250 mg in sodium chloride 0 9 % 250 mL IVPB, 15 mg/kg, Intravenous, Q8H, Catherine Pickard MD, Last Rate: 166 7 mL/hr at 01/23/19 0400, 1,250 mg at 01/23/19 0400    Blood Culture:   No results found for: BLOODCX    Wound Culture:   Lab Results   Component Value Date    WOUNDCULT (A) 01/18/2019     3+ Growth of Methicillin Resistant Staphylococcus aureus       Ins and Outs:  I/O last 24 hours: In: 2385 [P O :240; I V :2145]  Out: -           Physical Exam:  Vitals:    01/22/19 2340   BP: 137/79   Pulse: 91   Resp: 18   Temp: 98 1 °F (36 7 °C)   SpO2: 94%     left upper extremity - left index finger  · Diffuse swelling about the digit unchanged since yesterday, packing with dry sanguinous drainage no purulence, there is tenderness to palpation about the finger with tenderness down the flexor tendon sheath into the palm pain with passive extension but no fixed flexion of the finger  · Sensation intact to light touch but diminished all aspects of the index finger  · FDS and FDP intact as well of active flexion intact the MCP joint  · Brisk capillary refill throughout the finger    _*_*_*_*_*_*_*_*_*_*_*_*_*_*_*_*_*_*_*_*_*_*_*_*_*_*_*_*_*_*_*_*_*_*_*_*_*_*_*_*_*    Assessment: 43 y o male status post I and D left index finger for MRSA flexor tenosynovitis on 01/18/2019 and 01/21/2019    Plan:   · Up and out of bed  · PT/OT - try to maintain active flexion extension of the fingers  · DVT prophylaxis  · Analgesics per primary service  · IV antibiotics continue vancomycin  · Continue with Betadine soaks t i d   As ordered, no re-packing of the wound at this time  · Discharge planning, as long as the patient shows continued improvement and no signs of worsening, we will consider discharge home tomorrow on oral antibiotics pending sensitivities         Pasha Collazo PA-C

## 2019-01-23 NOTE — PLAN OF CARE
Problem: DISCHARGE PLANNING - CARE MANAGEMENT  Goal: Discharge to post-acute care or home with appropriate resources  INTERVENTIONS:  - Conduct assessment to determine patient/family and health care team treatment goals, and need for post-acute services based on payer coverage, community resources, and patient preferences, and barriers to discharge  - Address psychosocial, clinical, and financial barriers to discharge as identified in assessment in conjunction with the patient/family and health care team  - Arrange appropriate level of post-acute services according to patients   needs and preference and payer coverage in collaboration with the physician and health care team  - Communicate with and update the patient/family, physician, and health care team regarding progress on the discharge plan  - Arrange appropriate transportation to post-acute venues   Outcome: Progressing  LOS 5 DAYS  KEON called for Lyft transport from [facility] to patient home due to being transfer to this hospital  KEON spoke with Namrata Wahl at [facility] and obtained at 2:45pm Waiver signed and placed in medical records

## 2019-01-30 NOTE — TELEPHONE ENCOUNTER
Dr Donn Duong patient - L Hand Finger Abcess with Debridement 1/21    Patient calling that he thinks he had a seizure or some type of reaction from the antibiotic Bactrim; had shaking, could not move and was breathing funny thru his mouth, lasted for 1-2 minutes then resolved, felt like electrical current thru his brain  Advised that if this happens again he should call 911 or go to ER for evaluation  He would like another antibiotic called into pharmacy  Also requesting refills on his pain medication

## 2019-01-30 NOTE — TELEPHONE ENCOUNTER
He had an I&D done by Dr Kd Mcgregor on 1/21  He does not have transportation from [facility] to [facility]  Was just reaching out to see if Dr Adri Aguila could help  He needs suture removal  By 2/4    Let me know if the can help out or if patient should just go to ER for suture removal

## 2019-01-30 NOTE — TELEPHONE ENCOUNTER
We can remove sutures her  No pain meds will be provided  He is in post op period so we cannot bill him

## 2019-01-30 NOTE — TELEPHONE ENCOUNTER
Caller- dr Damion Vickers patient  - [phone]  Pt reports he was speaking to a "micheal" when I answered the phone  I do not know whom micheal is  Pt reports he recently had surgeries with dr Damion Vickers, he reports excruciating pain  He says that he wants to change the oxycodone/antibitoc medication because it is too strong   He utilizes [facility] in [place],

## 2019-01-30 NOTE — TELEPHONE ENCOUNTER
Dr Vianney Bennett, this patient would like to come to you to take over care and have his stitches removed  Please advise if okay to place him on your schedule

## 2019-01-30 NOTE — TELEPHONE ENCOUNTER
I left msg for patient to call office to set up appt for suture removal only with Dr Marysol Penny  Awaiting call back

## 2019-01-30 NOTE — TELEPHONE ENCOUNTER
Patient given above information and verbalized understanding  Patient states he has a postop appt with you on 2/27 however he still has stitches in his hand    Do you want to bring him in for soon suture removal?

## 2019-01-30 NOTE — TELEPHONE ENCOUNTER
It is not likely that it was the antibiotic  He should go to urgent care/ED   As far as pain meds we recommend NSAIDs/Tylenol at this time

## 2019-01-30 NOTE — TELEPHONE ENCOUNTER
Patient states he has no way to get to [facility] requesting if he can be seen by Dr Ramin Carver in [place]  Please advise if okay to ask Dr Ramin Carver?

## 2019-01-31 NOTE — TELEPHONE ENCOUNTER
Appt made for 2/1 for suture removal with Dr Duc Jensen in [facility]  Will you have your own schedule on 2/27 at [place] for patient to have a PO visit with you?  thanks

## (undated) DEVICE — CURITY STRETCH BANDAGE: Brand: CURITY

## (undated) DEVICE — CULTURE TUBE AEROBIC

## (undated) DEVICE — GAUZE SPONGES,16 PLY: Brand: CURITY

## (undated) DEVICE — CHLORAPREP HI-LITE 26ML ORANGE

## (undated) DEVICE — MINI BLADE ROUND TIP SHARP ON ONE SIDE

## (undated) DEVICE — LIGHT HANDLE COVER SLEEVE DISP BLUE STELLAR

## (undated) DEVICE — IODOFORM PACKING STRIP: Brand: CURITY

## (undated) DEVICE — GLOVE SRG BIOGEL 7

## (undated) DEVICE — CULTURE TUBE ANAEROBIC

## (undated) DEVICE — GLOVE INDICATOR PI UNDERGLOVE SZ 7 BLUE

## (undated) DEVICE — DISPOSABLE EQUIPMENT COVER: Brand: SMALL TOWEL DRAPE

## (undated) DEVICE — SPONGE PVP SCRUB WING STERILE

## (undated) DEVICE — CUFF TOURNIQUET 18 X 4 IN QUICK CONNECT DISP 1 BLADDER

## (undated) DEVICE — CYSTO TUBING SINGLE IRRIGATION

## (undated) DEVICE — INTENDED FOR TISSUE SEPARATION, AND OTHER PROCEDURES THAT REQUIRE A SHARP SURGICAL BLADE TO PUNCTURE OR CUT.: Brand: BARD-PARKER ® CARBON RIB-BACK BLADES

## (undated) DEVICE — ALL PURPOSE SPONGES,NON-WOVEN, 4 PLY: Brand: CURITY

## (undated) DEVICE — STERILE BETHLEHEM PLASTIC HAND: Brand: CARDINAL HEALTH

## (undated) DEVICE — OCCLUSIVE GAUZE STRIP,3% BISMUTH TRIBROMOPHENATE IN PETROLATUM BLEND: Brand: XEROFORM